# Patient Record
Sex: MALE | Race: WHITE | Employment: OTHER | URBAN - METROPOLITAN AREA
[De-identification: names, ages, dates, MRNs, and addresses within clinical notes are randomized per-mention and may not be internally consistent; named-entity substitution may affect disease eponyms.]

---

## 2017-01-24 RX ORDER — LISINOPRIL 10 MG/1
10 TABLET ORAL DAILY
Qty: 90 TABLET | Refills: 1 | Status: SHIPPED | OUTPATIENT
Start: 2017-01-24

## 2017-03-25 ENCOUNTER — OFFICE VISIT (OUTPATIENT)
Dept: FAMILY MEDICINE CLINIC | Age: 57
End: 2017-03-25

## 2017-03-25 VITALS
OXYGEN SATURATION: 95 % | BODY MASS INDEX: 28.93 KG/M2 | HEART RATE: 81 BPM | SYSTOLIC BLOOD PRESSURE: 134 MMHG | HEIGHT: 66 IN | DIASTOLIC BLOOD PRESSURE: 90 MMHG | TEMPERATURE: 98.7 F | WEIGHT: 180 LBS

## 2017-03-25 DIAGNOSIS — S29.011A STRAIN OF RIGHT PECTORALIS MUSCLE, INITIAL ENCOUNTER: Primary | ICD-10-CM

## 2017-03-25 DIAGNOSIS — Z87.892 HX OF ANAPHYLAXIS: ICD-10-CM

## 2017-03-25 PROCEDURE — 1036F TOBACCO NON-USER: CPT | Performed by: NURSE PRACTITIONER

## 2017-03-25 PROCEDURE — 3017F COLORECTAL CA SCREEN DOC REV: CPT | Performed by: NURSE PRACTITIONER

## 2017-03-25 PROCEDURE — G8484 FLU IMMUNIZE NO ADMIN: HCPCS | Performed by: NURSE PRACTITIONER

## 2017-03-25 PROCEDURE — G8427 DOCREV CUR MEDS BY ELIG CLIN: HCPCS | Performed by: NURSE PRACTITIONER

## 2017-03-25 PROCEDURE — 99213 OFFICE O/P EST LOW 20 MIN: CPT | Performed by: NURSE PRACTITIONER

## 2017-03-25 PROCEDURE — G8419 CALC BMI OUT NRM PARAM NOF/U: HCPCS | Performed by: NURSE PRACTITIONER

## 2017-03-25 RX ORDER — METHYLPREDNISOLONE 4 MG/1
TABLET ORAL
Qty: 1 KIT | Refills: 0 | Status: SHIPPED | OUTPATIENT
Start: 2017-03-25 | End: 2017-03-31

## 2017-03-25 RX ORDER — ACETAMINOPHEN 160 MG
TABLET,DISINTEGRATING ORAL
COMMUNITY
Start: 2011-04-25

## 2017-03-25 RX ORDER — PHENOL 1.4 %
1 AEROSOL, SPRAY (ML) MUCOUS MEMBRANE
COMMUNITY
Start: 2011-04-25

## 2017-03-25 RX ORDER — EPINEPHRINE 0.3 MG/.3ML
INJECTION SUBCUTANEOUS
Qty: 2 EACH | Refills: 2 | Status: SHIPPED | OUTPATIENT
Start: 2017-03-25 | End: 2018-06-13 | Stop reason: SDUPTHER

## 2017-03-25 ASSESSMENT — ENCOUNTER SYMPTOMS
SINUS PRESSURE: 0
ABDOMINAL DISTENTION: 0
COLOR CHANGE: 0
COUGH: 0
VOMITING: 0
WHEEZING: 0
SHORTNESS OF BREATH: 0
NAUSEA: 0
CONSTIPATION: 0
SORE THROAT: 0

## 2017-04-17 RX ORDER — CYCLOBENZAPRINE HCL 10 MG
TABLET ORAL
Qty: 30 TABLET | Refills: 1 | Status: SHIPPED | OUTPATIENT
Start: 2017-04-17

## 2017-04-24 ENCOUNTER — HOSPITAL ENCOUNTER (OUTPATIENT)
Dept: GENERAL RADIOLOGY | Age: 57
Discharge: HOME | End: 2017-04-24
Payer: COMMERCIAL

## 2017-04-24 ENCOUNTER — OFFICE VISIT (OUTPATIENT)
Dept: FAMILY MEDICINE CLINIC | Age: 57
End: 2017-04-24

## 2017-04-24 VITALS
HEIGHT: 66 IN | SYSTOLIC BLOOD PRESSURE: 120 MMHG | OXYGEN SATURATION: 97 % | DIASTOLIC BLOOD PRESSURE: 80 MMHG | HEART RATE: 90 BPM | BODY MASS INDEX: 28.28 KG/M2 | RESPIRATION RATE: 16 BRPM | WEIGHT: 176 LBS | TEMPERATURE: 98.9 F

## 2017-04-24 DIAGNOSIS — J30.1 SEASONAL ALLERGIC RHINITIS DUE TO POLLEN: ICD-10-CM

## 2017-04-24 DIAGNOSIS — R05.9 PAIN AGGRAVATED BY COUGHING AND DEEP BREATHING: Primary | ICD-10-CM

## 2017-04-24 DIAGNOSIS — R05.9 PAIN AGGRAVATED BY COUGHING AND DEEP BREATHING: ICD-10-CM

## 2017-04-24 DIAGNOSIS — R52 PAIN AGGRAVATED BY COUGHING AND DEEP BREATHING: Primary | ICD-10-CM

## 2017-04-24 DIAGNOSIS — R52 PAIN AGGRAVATED BY COUGHING AND DEEP BREATHING: ICD-10-CM

## 2017-04-24 PROCEDURE — G8419 CALC BMI OUT NRM PARAM NOF/U: HCPCS | Performed by: NURSE PRACTITIONER

## 2017-04-24 PROCEDURE — 99213 OFFICE O/P EST LOW 20 MIN: CPT | Performed by: NURSE PRACTITIONER

## 2017-04-24 PROCEDURE — 71020 XR CHEST STANDARD TWO VW: CPT

## 2017-04-24 PROCEDURE — 3017F COLORECTAL CA SCREEN DOC REV: CPT | Performed by: NURSE PRACTITIONER

## 2017-04-24 PROCEDURE — 1036F TOBACCO NON-USER: CPT | Performed by: NURSE PRACTITIONER

## 2017-04-24 PROCEDURE — G8427 DOCREV CUR MEDS BY ELIG CLIN: HCPCS | Performed by: NURSE PRACTITIONER

## 2017-04-24 ASSESSMENT — ENCOUNTER SYMPTOMS
WHEEZING: 0
COUGH: 1
HEMOPTYSIS: 0
RHINORRHEA: 0

## 2017-05-30 RX ORDER — ROPINIROLE 2 MG/1
TABLET, FILM COATED ORAL
Qty: 90 TABLET | Refills: 3 | Status: SHIPPED | OUTPATIENT
Start: 2017-05-30 | End: 2018-05-11 | Stop reason: SDUPTHER

## 2017-10-06 DIAGNOSIS — E03.9 UNSPECIFIED HYPOTHYROIDISM: ICD-10-CM

## 2017-10-06 DIAGNOSIS — R10.10 UPPER ABDOMINAL PAIN: ICD-10-CM

## 2017-10-06 RX ORDER — OMEPRAZOLE 40 MG/1
CAPSULE, DELAYED RELEASE ORAL
Qty: 90 CAPSULE | Refills: 3 | Status: SHIPPED | OUTPATIENT
Start: 2017-10-06 | End: 2018-10-19 | Stop reason: SDUPTHER

## 2017-10-06 RX ORDER — LEVOTHYROXINE SODIUM 0.03 MG/1
TABLET ORAL
Qty: 90 TABLET | Refills: 3 | Status: SHIPPED | OUTPATIENT
Start: 2017-10-06 | End: 2018-07-25

## 2017-10-06 NOTE — TELEPHONE ENCOUNTER
pharmacy requesting refill. Please approve or deny this refill request. The order is pended. Thank you. LOV 10/8/16 (with you)    Next Visit Date:  No future appointments.

## 2018-01-16 ENCOUNTER — OFFICE VISIT (OUTPATIENT)
Dept: FAMILY MEDICINE CLINIC | Age: 58
End: 2018-01-16

## 2018-01-16 VITALS
RESPIRATION RATE: 18 BRPM | SYSTOLIC BLOOD PRESSURE: 138 MMHG | HEIGHT: 66 IN | BODY MASS INDEX: 29.79 KG/M2 | TEMPERATURE: 98.5 F | WEIGHT: 185.38 LBS | HEART RATE: 74 BPM | DIASTOLIC BLOOD PRESSURE: 88 MMHG

## 2018-01-16 DIAGNOSIS — J11.1 INFLUENZA: Primary | ICD-10-CM

## 2018-01-16 DIAGNOSIS — J06.9 URTI (ACUTE UPPER RESPIRATORY INFECTION): ICD-10-CM

## 2018-01-16 DIAGNOSIS — J01.00 ACUTE MAXILLARY SINUSITIS, RECURRENCE NOT SPECIFIED: ICD-10-CM

## 2018-01-16 LAB
INFLUENZA A ANTIBODY: NEGATIVE
INFLUENZA B ANTIBODY: NEGATIVE

## 2018-01-16 PROCEDURE — G8419 CALC BMI OUT NRM PARAM NOF/U: HCPCS | Performed by: INTERNAL MEDICINE

## 2018-01-16 PROCEDURE — 87804 INFLUENZA ASSAY W/OPTIC: CPT | Performed by: INTERNAL MEDICINE

## 2018-01-16 PROCEDURE — G8484 FLU IMMUNIZE NO ADMIN: HCPCS | Performed by: INTERNAL MEDICINE

## 2018-01-16 PROCEDURE — 3017F COLORECTAL CA SCREEN DOC REV: CPT | Performed by: INTERNAL MEDICINE

## 2018-01-16 PROCEDURE — 1036F TOBACCO NON-USER: CPT | Performed by: INTERNAL MEDICINE

## 2018-01-16 PROCEDURE — 99213 OFFICE O/P EST LOW 20 MIN: CPT | Performed by: INTERNAL MEDICINE

## 2018-01-16 PROCEDURE — G8427 DOCREV CUR MEDS BY ELIG CLIN: HCPCS | Performed by: INTERNAL MEDICINE

## 2018-01-16 RX ORDER — GUAIFENESIN 600 MG/1
600 TABLET, EXTENDED RELEASE ORAL 2 TIMES DAILY
Qty: 30 TABLET | Refills: 1 | Status: SHIPPED | OUTPATIENT
Start: 2018-01-16

## 2018-01-16 RX ORDER — OSELTAMIVIR PHOSPHATE 75 MG/1
75 CAPSULE ORAL 2 TIMES DAILY
Qty: 10 CAPSULE | Refills: 0 | Status: SHIPPED | OUTPATIENT
Start: 2018-01-16 | End: 2018-01-21

## 2018-01-16 ASSESSMENT — ENCOUNTER SYMPTOMS
CONSTIPATION: 0
EYE PAIN: 0
ABDOMINAL PAIN: 0
COLOR CHANGE: 0
NAUSEA: 0
BACK PAIN: 0
BLOOD IN STOOL: 0
EYE DISCHARGE: 0
DIARRHEA: 0
ABDOMINAL DISTENTION: 0
EYE ITCHING: 0
PHOTOPHOBIA: 0
EYE REDNESS: 0

## 2018-01-16 ASSESSMENT — PATIENT HEALTH QUESTIONNAIRE - PHQ9
SUM OF ALL RESPONSES TO PHQ9 QUESTIONS 1 & 2: 0
1. LITTLE INTEREST OR PLEASURE IN DOING THINGS: 0
2. FEELING DOWN, DEPRESSED OR HOPELESS: 0
SUM OF ALL RESPONSES TO PHQ QUESTIONS 1-9: 0

## 2018-01-16 NOTE — PROGRESS NOTES
Subjective:      Patient ID: Vamshi Johnson is a 61 y.o. male    Cough and fatigue x 1 day    HPI    Presents with 1 day of cough productive of clear phlegm and sore throat. Assoc generalized body aches and fatigue. Associated feverish feeling and chills. No chest pian, no SOB or wheezing. Assoc nasal congestion and runny nose. Patient declined the flu shot this season. Past Medical History:   Diagnosis Date    History of IBS     Hyperlipidemia     Hypothyroidism     LORIE (obstructive sleep apnea)     Restless legs syndrome      Past Surgical History:   Procedure Laterality Date    COLONOSCOPY  06/27/2013    DR. WREN     Social History     Social History    Marital status:      Spouse name: N/A    Number of children: N/A    Years of education: N/A     Occupational History    Not on file. Social History Main Topics    Smoking status: Never Smoker    Smokeless tobacco: Never Used    Alcohol use Yes      Comment: social    Drug use: Unknown    Sexual activity: Not on file     Other Topics Concern    Not on file     Social History Narrative    No narrative on file     History reviewed. No pertinent family history.   Allergies:  Bee venom; Klonopin [clonazepam]; and Zocor [simvastatin]  Patient Active Problem List   Diagnosis    Restless legs syndrome    LORIE (obstructive sleep apnea)    Acquired hypothyroidism    Mixed hyperlipidemia     Current Outpatient Prescriptions on File Prior to Visit   Medication Sig Dispense Refill    levothyroxine (SYNTHROID) 25 MCG tablet TAKE 1 TABLET EVERY MORNING BEFORE MEAL 90 tablet 3    omeprazole (PRILOSEC) 40 MG delayed release capsule TAKE 1 CAPSULE BY MOUTH DAILY 90 capsule 3    rOPINIRole (REQUIP) 2 MG tablet TAKE 1 TABLET EVERY DAY 90 tablet 3    cyclobenzaprine (FLEXERIL) 10 MG tablet TAKE 1 TABLET BY MOUTH 3 TIMES DAILY AS NEEDED FOR MUSCLE SPASMS 30 tablet 1    Garlic 4169 MG TABS Take by mouth      Cholecalciferol (VITAMIN D3)

## 2018-01-22 ENCOUNTER — OFFICE VISIT (OUTPATIENT)
Dept: FAMILY MEDICINE CLINIC | Age: 58
End: 2018-01-22
Payer: COMMERCIAL

## 2018-01-22 VITALS
HEART RATE: 104 BPM | SYSTOLIC BLOOD PRESSURE: 150 MMHG | RESPIRATION RATE: 12 BRPM | TEMPERATURE: 98.6 F | DIASTOLIC BLOOD PRESSURE: 98 MMHG

## 2018-01-22 DIAGNOSIS — J01.40 ACUTE NON-RECURRENT PANSINUSITIS: Primary | ICD-10-CM

## 2018-01-22 PROCEDURE — G8427 DOCREV CUR MEDS BY ELIG CLIN: HCPCS | Performed by: NURSE PRACTITIONER

## 2018-01-22 PROCEDURE — G8484 FLU IMMUNIZE NO ADMIN: HCPCS | Performed by: NURSE PRACTITIONER

## 2018-01-22 PROCEDURE — 1036F TOBACCO NON-USER: CPT | Performed by: NURSE PRACTITIONER

## 2018-01-22 PROCEDURE — 99213 OFFICE O/P EST LOW 20 MIN: CPT | Performed by: NURSE PRACTITIONER

## 2018-01-22 PROCEDURE — 3017F COLORECTAL CA SCREEN DOC REV: CPT | Performed by: NURSE PRACTITIONER

## 2018-01-22 PROCEDURE — G8417 CALC BMI ABV UP PARAM F/U: HCPCS | Performed by: NURSE PRACTITIONER

## 2018-01-22 RX ORDER — AMOXICILLIN AND CLAVULANATE POTASSIUM 562.5; 437.5; 62.5 MG/1; MG/1; MG/1
1 TABLET, FILM COATED, EXTENDED RELEASE ORAL 2 TIMES DAILY
Qty: 14 TABLET | Refills: 0 | Status: SHIPPED | OUTPATIENT
Start: 2018-01-22 | End: 2018-01-29

## 2018-01-22 ASSESSMENT — ENCOUNTER SYMPTOMS
RHINORRHEA: 1
COUGH: 1

## 2018-01-22 NOTE — PROGRESS NOTES
(REQUIP) 2 MG tablet TAKE 1 TABLET EVERY DAY 90 tablet 3    cyclobenzaprine (FLEXERIL) 10 MG tablet TAKE 1 TABLET BY MOUTH 3 TIMES DAILY AS NEEDED FOR MUSCLE SPASMS 30 tablet 1    Garlic 4392 MG TABS Take by mouth      Cholecalciferol (VITAMIN D3) 2000 UNITS CAPS Take by mouth      Multiple Vitamins-Minerals (MULTIVITAMIN ADULTS 50+) TABS Take 1 tablet by mouth      EPINEPHrine (EPIPEN) 0.3 MG/0.3ML SOAJ injection Use as directed for allergic reaction 2 each 2    lisinopril (PRINIVIL;ZESTRIL) 10 MG tablet Take 1 tablet by mouth daily 90 tablet 1    meloxicam (MOBIC) 7.5 MG tablet Take 1 tablet by mouth daily 30 tablet 3    cetirizine (ZYRTEC) 10 MG tablet Take 10 mg by mouth daily      valACYclovir (VALTREX) 500 MG tablet TAKE 1 TABLET BY MOUTH 2 TIMES DAILY 30 tablet 2    EPINEPHrine (EPIPEN 2-PAMELLA) 0.3 MG/0.3ML TRUE injection Inject 0.3 mLs into the skin once as needed for 1 dose. Use as directed for allergic reaction 2 Device 4     No current facility-administered medications on file prior to visit. Allergies:  Bee venom; Klonopin [clonazepam]; and Zocor [simvastatin]    Review of Systems   HENT: Positive for ear pain, postnasal drip and rhinorrhea. Respiratory: Positive for cough. Objective:   BP (!) 150/98   Pulse 104   Temp 98.6 °F (37 °C) (Temporal)   Resp 12     Physical Exam   Constitutional: He is oriented to person, place, and time. He appears well-developed and well-nourished. HENT:   Right Ear: A middle ear effusion is present. Left Ear: A middle ear effusion is present. Nose: Mucosal edema and rhinorrhea present. Right sinus exhibits maxillary sinus tenderness and frontal sinus tenderness. Left sinus exhibits maxillary sinus tenderness and frontal sinus tenderness. Mouth/Throat: Uvula is midline, oropharynx is clear and moist and mucous membranes are normal.   Eyes: Conjunctivae are normal.   Cardiovascular: Normal rate, regular rhythm and normal heart sounds.

## 2018-01-25 ENCOUNTER — TELEPHONE (OUTPATIENT)
Dept: OTHER | Facility: CLINIC | Age: 58
End: 2018-01-25

## 2018-03-07 DIAGNOSIS — B00.9 HSV INFECTION: ICD-10-CM

## 2018-03-07 RX ORDER — VALACYCLOVIR HYDROCHLORIDE 500 MG/1
TABLET, FILM COATED ORAL
Qty: 30 TABLET | Refills: 2 | Status: SHIPPED | OUTPATIENT
Start: 2018-03-07 | End: 2020-10-06 | Stop reason: SDUPTHER

## 2018-03-07 NOTE — TELEPHONE ENCOUNTER
Pharmacy requesting refill please approve or deny. Last seen 1/22/2018  Last filled 07/19/2016    No future appointments.

## 2018-05-10 ENCOUNTER — TELEPHONE (OUTPATIENT)
Dept: FAMILY MEDICINE CLINIC | Age: 58
End: 2018-05-10

## 2018-05-11 RX ORDER — ROPINIROLE 2 MG/1
TABLET, FILM COATED ORAL
Qty: 90 TABLET | Refills: 3 | Status: SHIPPED | OUTPATIENT
Start: 2018-05-11 | End: 2019-02-11 | Stop reason: SDUPTHER

## 2018-06-13 DIAGNOSIS — Z87.892 HX OF ANAPHYLAXIS: ICD-10-CM

## 2018-06-13 DIAGNOSIS — Z78.9 ALLERGY HISTORY UNKNOWN: ICD-10-CM

## 2018-06-13 RX ORDER — EPINEPHRINE 0.3 MG/.3ML
INJECTION SUBCUTANEOUS
Qty: 2 EACH | Refills: 4 | Status: SHIPPED | OUTPATIENT
Start: 2018-06-13 | End: 2018-07-09 | Stop reason: SDUPTHER

## 2018-07-09 ENCOUNTER — OFFICE VISIT (OUTPATIENT)
Dept: FAMILY MEDICINE CLINIC | Age: 58
End: 2018-07-09
Payer: COMMERCIAL

## 2018-07-09 VITALS
OXYGEN SATURATION: 99 % | BODY MASS INDEX: 28.93 KG/M2 | SYSTOLIC BLOOD PRESSURE: 142 MMHG | HEART RATE: 85 BPM | WEIGHT: 180 LBS | TEMPERATURE: 97.7 F | RESPIRATION RATE: 16 BRPM | HEIGHT: 66 IN | DIASTOLIC BLOOD PRESSURE: 98 MMHG

## 2018-07-09 DIAGNOSIS — I10 BENIGN ESSENTIAL HTN: ICD-10-CM

## 2018-07-09 DIAGNOSIS — E78.2 MIXED HYPERLIPIDEMIA: ICD-10-CM

## 2018-07-09 DIAGNOSIS — Z12.5 PROSTATE CANCER SCREENING: ICD-10-CM

## 2018-07-09 DIAGNOSIS — Z23 NEED FOR TETANUS BOOSTER: ICD-10-CM

## 2018-07-09 DIAGNOSIS — Z00.00 ANNUAL PHYSICAL EXAM: Primary | ICD-10-CM

## 2018-07-09 PROCEDURE — 99396 PREV VISIT EST AGE 40-64: CPT | Performed by: FAMILY MEDICINE

## 2018-07-09 PROCEDURE — 90714 TD VACC NO PRESV 7 YRS+ IM: CPT | Performed by: FAMILY MEDICINE

## 2018-07-09 PROCEDURE — 90471 IMMUNIZATION ADMIN: CPT | Performed by: FAMILY MEDICINE

## 2018-07-09 NOTE — PROGRESS NOTES
Subjective  Shefalisenthil Melendez, 60 y.o. male presents today with:  Chief Complaint   Patient presents with    Annual Exam       HPI  Patient presents today for his annual physical.  Doing well-no cough/sob  Doing ok off zestril  ;NO cp/palp/irwin/edema   ; No cardio-pulmonary probs;compliant with diet/rxs;no new gi-gu complaints  Active   Needs labs   Rev/rxs; No abuse nor side effects on meds     Review of Systems   Constitutional: Negative for fatigue. HENT: Positive for congestion. Eyes: Negative for visual disturbance. Respiratory: Negative for shortness of breath. Cardiovascular: Negative for chest pain, palpitations and leg swelling. Gastrointestinal: Negative for abdominal pain and nausea. Genitourinary: Negative for dysuria, flank pain and frequency. Musculoskeletal: Positive for arthralgias and myalgias. Neurological: Negative for headaches. Psychiatric/Behavioral: Negative for dysphoric mood and sleep disturbance. Allergies   Allergen Reactions    Bee Venom Hives    Klonopin [Clonazepam] Other (See Comments)     Elevated CNS    Zocor [Simvastatin] Other (See Comments)     Headache       Past Medical History:   Diagnosis Date    History of IBS     Hyperlipidemia     Hypothyroidism     LORIE (obstructive sleep apnea)     Restless legs syndrome      Past Surgical History:   Procedure Laterality Date    COLONOSCOPY  06/27/2013    DR. WREN     Social History     Social History    Marital status:      Spouse name: N/A    Number of children: N/A    Years of education: N/A     Occupational History    Not on file. Social History Main Topics    Smoking status: Never Smoker    Smokeless tobacco: Never Used    Alcohol use Yes      Comment: social    Drug use: Unknown    Sexual activity: Not on file     Other Topics Concern    Not on file     Social History Narrative    No narrative on file     No family history on file.        Current Outpatient Prescriptions on File Prior Future     Number of Occurrences:   1     Standing Expiration Date:   7/9/2019    Lipid Panel     Standing Status:   Future     Number of Occurrences:   1     Standing Expiration Date:   7/9/2019     Order Specific Question:   Is Patient Fasting?/# of Hours     Answer:   10 HOURS    Comprehensive Metabolic Panel     Standing Status:   Future     Number of Occurrences:   1     Standing Expiration Date:   7/9/2019     No orders of the defined types were placed in this encounter. Medications Discontinued During This Encounter   Medication Reason    EPINEPHrine (EPIPEN) 0.3 MG/0.3ML SOAJ injection DUPLICATE     Return in about 3 months (around 10/9/2018) for htn. Call if sxs  Call or return to clinic prn if these symptoms worsen or fail to improve as anticipated.       Jodi Gan, DO

## 2018-07-14 DIAGNOSIS — Z12.5 PROSTATE CANCER SCREENING: ICD-10-CM

## 2018-07-14 DIAGNOSIS — E03.9 HYPOTHYROIDISM: ICD-10-CM

## 2018-07-14 DIAGNOSIS — I10 BENIGN ESSENTIAL HTN: ICD-10-CM

## 2018-07-14 DIAGNOSIS — Z00.00 ANNUAL PHYSICAL EXAM: ICD-10-CM

## 2018-07-14 LAB
ALBUMIN SERPL-MCNC: 4 G/DL (ref 3.9–4.9)
ALP BLD-CCNC: 81 U/L (ref 35–104)
ALT SERPL-CCNC: 33 U/L (ref 0–41)
ANION GAP SERPL CALCULATED.3IONS-SCNC: 12 MEQ/L (ref 7–13)
AST SERPL-CCNC: 22 U/L (ref 0–40)
BILIRUB SERPL-MCNC: <0.2 MG/DL (ref 0–1.2)
BUN BLDV-MCNC: 16 MG/DL (ref 6–20)
CALCIUM SERPL-MCNC: 8.7 MG/DL (ref 8.6–10.2)
CHLORIDE BLD-SCNC: 103 MEQ/L (ref 98–107)
CHOLESTEROL, TOTAL: 417 MG/DL (ref 0–199)
CO2: 27 MEQ/L (ref 22–29)
CREAT SERPL-MCNC: 0.8 MG/DL (ref 0.7–1.2)
GFR AFRICAN AMERICAN: >60
GFR NON-AFRICAN AMERICAN: >60
GLOBULIN: 2.5 G/DL (ref 2.3–3.5)
GLUCOSE BLD-MCNC: 98 MG/DL (ref 74–109)
HDLC SERPL-MCNC: 43 MG/DL (ref 40–59)
LDL CHOLESTEROL CALCULATED: 303 MG/DL (ref 0–129)
POTASSIUM SERPL-SCNC: 4.4 MEQ/L (ref 3.5–5.1)
PROSTATE SPECIFIC ANTIGEN: 0.79 NG/ML (ref 0–5.4)
SODIUM BLD-SCNC: 142 MEQ/L (ref 132–144)
TOTAL PROTEIN: 6.5 G/DL (ref 6.4–8.1)
TRIGL SERPL-MCNC: 357 MG/DL (ref 0–200)
TSH SERPL DL<=0.05 MIU/L-ACNC: 5.1 UIU/ML (ref 0.27–4.2)

## 2018-07-22 DIAGNOSIS — E03.9 ACQUIRED HYPOTHYROIDISM: Primary | ICD-10-CM

## 2018-07-22 RX ORDER — LEVOTHYROXINE SODIUM 0.05 MG/1
50 TABLET ORAL DAILY
Qty: 60 TABLET | Refills: 3 | Status: SHIPPED | OUTPATIENT
Start: 2018-07-22 | End: 2019-03-05 | Stop reason: SDUPTHER

## 2018-07-22 ASSESSMENT — ENCOUNTER SYMPTOMS
ABDOMINAL PAIN: 0
SHORTNESS OF BREATH: 0
NAUSEA: 0

## 2018-07-25 ENCOUNTER — OFFICE VISIT (OUTPATIENT)
Dept: FAMILY MEDICINE CLINIC | Age: 58
End: 2018-07-25
Payer: COMMERCIAL

## 2018-07-25 VITALS
HEART RATE: 79 BPM | WEIGHT: 182.6 LBS | DIASTOLIC BLOOD PRESSURE: 70 MMHG | TEMPERATURE: 98.5 F | RESPIRATION RATE: 16 BRPM | BODY MASS INDEX: 29.92 KG/M2 | SYSTOLIC BLOOD PRESSURE: 132 MMHG

## 2018-07-25 DIAGNOSIS — E78.2 MIXED HYPERLIPIDEMIA: Primary | ICD-10-CM

## 2018-07-25 DIAGNOSIS — M25.50 ARTHRALGIA, UNSPECIFIED JOINT: ICD-10-CM

## 2018-07-25 DIAGNOSIS — E03.9 ACQUIRED HYPOTHYROIDISM: ICD-10-CM

## 2018-07-25 PROCEDURE — 1036F TOBACCO NON-USER: CPT | Performed by: NURSE PRACTITIONER

## 2018-07-25 PROCEDURE — G8427 DOCREV CUR MEDS BY ELIG CLIN: HCPCS | Performed by: NURSE PRACTITIONER

## 2018-07-25 PROCEDURE — 3017F COLORECTAL CA SCREEN DOC REV: CPT | Performed by: NURSE PRACTITIONER

## 2018-07-25 PROCEDURE — G8417 CALC BMI ABV UP PARAM F/U: HCPCS | Performed by: NURSE PRACTITIONER

## 2018-07-25 PROCEDURE — 99213 OFFICE O/P EST LOW 20 MIN: CPT | Performed by: NURSE PRACTITIONER

## 2018-07-25 RX ORDER — ROSUVASTATIN CALCIUM 20 MG/1
20 TABLET, COATED ORAL DAILY
Qty: 90 TABLET | Refills: 1 | Status: SHIPPED | OUTPATIENT
Start: 2018-07-25 | End: 2019-01-28 | Stop reason: SDUPTHER

## 2018-07-25 RX ORDER — MELOXICAM 7.5 MG/1
7.5 TABLET ORAL DAILY
Qty: 90 TABLET | Refills: 1 | Status: SHIPPED | OUTPATIENT
Start: 2018-07-25

## 2018-07-25 RX ORDER — ICOSAPENT ETHYL 1000 MG/1
2 CAPSULE ORAL 2 TIMES DAILY
Qty: 360 CAPSULE | Refills: 1 | Status: SHIPPED | OUTPATIENT
Start: 2018-07-25

## 2018-07-25 ASSESSMENT — ENCOUNTER SYMPTOMS
GASTROINTESTINAL NEGATIVE: 1
RESPIRATORY NEGATIVE: 1

## 2018-07-25 NOTE — PROGRESS NOTES
Subjective:      Patient ID: Lisbeth Woodard is a 60 y.o. male who presents today for:  Chief Complaint   Patient presents with    Hyperlipidemia     follow up on labs    Hypertension       HPI    Patient had physical recently, wanting to f/u on blood work. Doing well, no concerns or complaints today. Past Medical History:   Diagnosis Date    History of IBS     Hyperlipidemia     Hypothyroidism     LORIE (obstructive sleep apnea)     Restless legs syndrome      Past Surgical History:   Procedure Laterality Date    COLONOSCOPY  06/27/2013    DR. WREN     No family history on file. Social History     Social History    Marital status:      Spouse name: N/A    Number of children: N/A    Years of education: N/A     Occupational History    Not on file.      Social History Main Topics    Smoking status: Never Smoker    Smokeless tobacco: Never Used    Alcohol use Yes      Comment: social    Drug use: Unknown    Sexual activity: Not on file     Other Topics Concern    Not on file     Social History Narrative    No narrative on file     Current Outpatient Prescriptions on File Prior to Visit   Medication Sig Dispense Refill    levothyroxine (SYNTHROID) 50 MCG tablet Take 1 tablet by mouth Daily 60 tablet 3    rOPINIRole (REQUIP) 2 MG tablet TAKE 1 TABLET EVERY DAY 90 tablet 3    valACYclovir (VALTREX) 500 MG tablet TAKE 1 TABLET BY MOUTH 2 TIMES DAILY 30 tablet 2    guaiFENesin (MUCINEX) 600 MG extended release tablet Take 1 tablet by mouth 2 times daily 30 tablet 1    omeprazole (PRILOSEC) 40 MG delayed release capsule TAKE 1 CAPSULE BY MOUTH DAILY 90 capsule 3    cyclobenzaprine (FLEXERIL) 10 MG tablet TAKE 1 TABLET BY MOUTH 3 TIMES DAILY AS NEEDED FOR MUSCLE SPASMS 30 tablet 1    Garlic 2960 MG TABS Take by mouth      Cholecalciferol (VITAMIN D3) 2000 UNITS CAPS Take by mouth      Multiple Vitamins-Minerals (MULTIVITAMIN ADULTS 50+) TABS Take 1 tablet by mouth      cetirizine (ZYRTEC) 10 MG tablet Take 10 mg by mouth daily      EPINEPHrine (EPIPEN 2-PAMELLA) 0.3 MG/0.3ML TRUE injection Inject 0.3 mLs into the skin once as needed for 1 dose. Use as directed for allergic reaction 2 Device 4    lisinopril (PRINIVIL;ZESTRIL) 10 MG tablet Take 1 tablet by mouth daily 90 tablet 1     No current facility-administered medications on file prior to visit. Allergies:  Bee venom; Klonopin [clonazepam]; and Zocor [simvastatin]    Review of Systems   Constitutional: Negative for fatigue. HENT: Negative. Respiratory: Negative. Cardiovascular: Negative. Gastrointestinal: Negative. Genitourinary: Negative. Musculoskeletal: Positive for arthralgias. Neurological: Negative. Objective:   /70   Pulse 79   Temp 98.5 °F (36.9 °C) (Temporal)   Resp 16   Wt 182 lb 9.6 oz (82.8 kg)   BMI 29.92 kg/m²     Physical Exam   Constitutional: He is oriented to person, place, and time. He appears well-developed and well-nourished. Eyes: Conjunctivae are normal.   Neck: Carotid bruit is not present. Cardiovascular: Normal rate, regular rhythm and normal heart sounds. Pulmonary/Chest: Effort normal and breath sounds normal. He has no wheezes. He has no rales. Abdominal: Soft. Bowel sounds are normal. He exhibits no distension and no abdominal bruit. There is no tenderness. Musculoskeletal: He exhibits no edema. Neurological: He is alert and oriented to person, place, and time. Skin: Skin is warm and dry. Assessment:      Diagnosis Orders   1. Mixed hyperlipidemia     2. Acquired hypothyroidism     3. Arthralgia, unspecified joint           Plan:      No orders of the defined types were placed in this encounter.     Orders Placed This Encounter   Medications    meloxicam (MOBIC) 7.5 MG tablet     Sig: Take 1 tablet by mouth daily     Dispense:  90 tablet     Refill:  1    rosuvastatin (CRESTOR) 20 MG tablet     Sig: Take 1 tablet by mouth daily     Dispense:  90 tablet

## 2018-10-02 ENCOUNTER — TELEPHONE (OUTPATIENT)
Dept: FAMILY MEDICINE CLINIC | Age: 58
End: 2018-10-02

## 2018-10-05 ENCOUNTER — OFFICE VISIT (OUTPATIENT)
Dept: FAMILY MEDICINE CLINIC | Age: 58
End: 2018-10-05
Payer: COMMERCIAL

## 2018-10-05 VITALS
HEIGHT: 66 IN | HEART RATE: 72 BPM | SYSTOLIC BLOOD PRESSURE: 124 MMHG | WEIGHT: 179 LBS | TEMPERATURE: 97.9 F | BODY MASS INDEX: 28.77 KG/M2 | DIASTOLIC BLOOD PRESSURE: 76 MMHG | RESPIRATION RATE: 14 BRPM

## 2018-10-05 DIAGNOSIS — R53.82 CHRONIC FATIGUE: ICD-10-CM

## 2018-10-05 DIAGNOSIS — Z23 NEED FOR INFLUENZA VACCINATION: ICD-10-CM

## 2018-10-05 DIAGNOSIS — E78.2 MIXED HYPERLIPIDEMIA: ICD-10-CM

## 2018-10-05 DIAGNOSIS — E03.9 ACQUIRED HYPOTHYROIDISM: Primary | ICD-10-CM

## 2018-10-05 PROCEDURE — 99213 OFFICE O/P EST LOW 20 MIN: CPT | Performed by: FAMILY MEDICINE

## 2018-10-05 PROCEDURE — G8427 DOCREV CUR MEDS BY ELIG CLIN: HCPCS | Performed by: FAMILY MEDICINE

## 2018-10-05 PROCEDURE — 90471 IMMUNIZATION ADMIN: CPT | Performed by: FAMILY MEDICINE

## 2018-10-05 PROCEDURE — 1036F TOBACCO NON-USER: CPT | Performed by: FAMILY MEDICINE

## 2018-10-05 PROCEDURE — G8417 CALC BMI ABV UP PARAM F/U: HCPCS | Performed by: FAMILY MEDICINE

## 2018-10-05 PROCEDURE — 3017F COLORECTAL CA SCREEN DOC REV: CPT | Performed by: FAMILY MEDICINE

## 2018-10-05 PROCEDURE — G8482 FLU IMMUNIZE ORDER/ADMIN: HCPCS | Performed by: FAMILY MEDICINE

## 2018-10-05 PROCEDURE — 90688 IIV4 VACCINE SPLT 0.5 ML IM: CPT | Performed by: FAMILY MEDICINE

## 2018-10-05 NOTE — PROGRESS NOTES
lisinopril (PRINIVIL;ZESTRIL) 10 MG tablet Take 1 tablet by mouth daily 90 tablet 1     No current facility-administered medications on file prior to visit. Objective    Vitals:    10/05/18 0929   BP: 124/76   Site: Right Upper Arm   Position: Sitting   Cuff Size: Medium Adult   Pulse: 72   Resp: 14   Temp: 97.9 °F (36.6 °C)   TempSrc: Temporal   Weight: 179 lb (81.2 kg)   Height: 5' 5.5\" (1.664 m)     Physical Exam   Constitutional: He is oriented to person, place, and time and well-developed, well-nourished, and in no distress. No distress. Eyes: No scleral icterus. Neck: Normal range of motion. Neck supple. Carotid bruit is not present. No neck rigidity. No thyroid mass and no thyromegaly present. Cardiovascular: Normal rate, regular rhythm, S1 normal, S2 normal and normal heart sounds. No extrasystoles are present. No murmur heard. Pulmonary/Chest: Effort normal and breath sounds normal.   Abdominal: Soft. Normal appearance. He exhibits no mass. There is no hepatosplenomegaly. There is no tenderness. There is no rebound and no CVA tenderness. Musculoskeletal: He exhibits no edema. Lymphadenopathy:        Head (right side): No submandibular adenopathy present. Head (left side): No submandibular adenopathy present. Right cervical: No posterior cervical adenopathy present. Left cervical: No posterior cervical adenopathy present. Neurological: He is alert and oriented to person, place, and time. He has intact cranial nerves. Gait normal.   Skin: He is not diaphoretic. Psychiatric: Mood and affect normal.     Orders Only on 07/14/2018   Component Date Value Ref Range Status    TSH 07/14/2018 5.100* 0.270 - 4.200 uIU/mL Final    PSA 07/14/2018 0.79  0.00 - 5.40 ng/mL Final    Comment: When the Total PSA is between 3.00 and 10.00 ng/mL, consider  requesting a Free PSA to aid in diagnosis.         Cholesterol, Total 07/14/2018 417* 0 - 199 mg/dL Final    ATP III

## 2018-10-06 DIAGNOSIS — E78.2 MIXED HYPERLIPIDEMIA: ICD-10-CM

## 2018-10-06 DIAGNOSIS — R53.82 CHRONIC FATIGUE: ICD-10-CM

## 2018-10-06 DIAGNOSIS — E03.9 ACQUIRED HYPOTHYROIDISM: ICD-10-CM

## 2018-10-06 LAB
CHOLESTEROL, TOTAL: 191 MG/DL (ref 0–199)
HDLC SERPL-MCNC: 43 MG/DL (ref 40–59)
LDL CHOLESTEROL CALCULATED: 103 MG/DL (ref 0–129)
TRIGL SERPL-MCNC: 226 MG/DL (ref 0–200)
TSH SERPL DL<=0.05 MIU/L-ACNC: 3.41 UIU/ML (ref 0.27–4.2)

## 2018-10-09 LAB — TESTOSTERONE TOTAL-MALE: 434 NG/DL (ref 300–890)

## 2018-10-14 ASSESSMENT — ENCOUNTER SYMPTOMS
DIARRHEA: 0
ABDOMINAL PAIN: 0
SHORTNESS OF BREATH: 0
BLOOD IN STOOL: 0
NAUSEA: 0

## 2018-10-19 DIAGNOSIS — R10.10 UPPER ABDOMINAL PAIN: ICD-10-CM

## 2018-10-19 RX ORDER — OMEPRAZOLE 40 MG/1
CAPSULE, DELAYED RELEASE ORAL
Qty: 90 CAPSULE | Refills: 3 | Status: SHIPPED | OUTPATIENT
Start: 2018-10-19

## 2019-01-28 RX ORDER — ROSUVASTATIN CALCIUM 20 MG/1
TABLET, COATED ORAL
Qty: 90 TABLET | Refills: 1 | Status: SHIPPED | OUTPATIENT
Start: 2019-01-28

## 2019-02-11 RX ORDER — ROPINIROLE 2 MG/1
TABLET, FILM COATED ORAL
Qty: 90 TABLET | Refills: 3 | Status: SHIPPED | OUTPATIENT
Start: 2019-02-11

## 2019-03-05 DIAGNOSIS — E03.9 ACQUIRED HYPOTHYROIDISM: ICD-10-CM

## 2019-03-05 RX ORDER — LEVOTHYROXINE SODIUM 0.05 MG/1
50 TABLET ORAL DAILY
Qty: 60 TABLET | Refills: 3 | Status: SHIPPED | OUTPATIENT
Start: 2019-03-05

## 2020-10-06 ENCOUNTER — OFFICE VISIT (OUTPATIENT)
Dept: PRIMARY CARE CLINIC | Age: 60
End: 2020-10-06
Payer: COMMERCIAL

## 2020-10-06 VITALS
HEART RATE: 76 BPM | OXYGEN SATURATION: 98 % | WEIGHT: 194.6 LBS | DIASTOLIC BLOOD PRESSURE: 88 MMHG | SYSTOLIC BLOOD PRESSURE: 138 MMHG | HEIGHT: 65 IN | BODY MASS INDEX: 32.42 KG/M2 | TEMPERATURE: 98.4 F | RESPIRATION RATE: 16 BRPM

## 2020-10-06 PROCEDURE — 3017F COLORECTAL CA SCREEN DOC REV: CPT | Performed by: NURSE PRACTITIONER

## 2020-10-06 PROCEDURE — G8484 FLU IMMUNIZE NO ADMIN: HCPCS | Performed by: NURSE PRACTITIONER

## 2020-10-06 PROCEDURE — G8427 DOCREV CUR MEDS BY ELIG CLIN: HCPCS | Performed by: NURSE PRACTITIONER

## 2020-10-06 PROCEDURE — 4004F PT TOBACCO SCREEN RCVD TLK: CPT | Performed by: NURSE PRACTITIONER

## 2020-10-06 PROCEDURE — G8417 CALC BMI ABV UP PARAM F/U: HCPCS | Performed by: NURSE PRACTITIONER

## 2020-10-06 PROCEDURE — 99213 OFFICE O/P EST LOW 20 MIN: CPT | Performed by: NURSE PRACTITIONER

## 2020-10-06 RX ORDER — ACYCLOVIR 50 MG/G
OINTMENT TOPICAL
Qty: 1 TUBE | Refills: 0 | Status: SHIPPED | OUTPATIENT
Start: 2020-10-06 | End: 2020-10-13

## 2020-10-06 RX ORDER — VALACYCLOVIR HYDROCHLORIDE 500 MG/1
TABLET, FILM COATED ORAL
Qty: 30 TABLET | Refills: 0 | Status: SHIPPED | OUTPATIENT
Start: 2020-10-06

## 2020-10-06 RX ORDER — AMOXICILLIN AND CLAVULANATE POTASSIUM 875; 125 MG/1; MG/1
1 TABLET, FILM COATED ORAL 2 TIMES DAILY
Qty: 14 TABLET | Refills: 0 | Status: SHIPPED | OUTPATIENT
Start: 2020-10-06 | End: 2020-10-13

## 2020-10-06 SDOH — ECONOMIC STABILITY: INCOME INSECURITY: HOW HARD IS IT FOR YOU TO PAY FOR THE VERY BASICS LIKE FOOD, HOUSING, MEDICAL CARE, AND HEATING?: NOT HARD AT ALL

## 2020-10-06 SDOH — ECONOMIC STABILITY: FOOD INSECURITY: WITHIN THE PAST 12 MONTHS, YOU WORRIED THAT YOUR FOOD WOULD RUN OUT BEFORE YOU GOT MONEY TO BUY MORE.: NEVER TRUE

## 2020-10-06 SDOH — ECONOMIC STABILITY: FOOD INSECURITY: WITHIN THE PAST 12 MONTHS, THE FOOD YOU BOUGHT JUST DIDN'T LAST AND YOU DIDN'T HAVE MONEY TO GET MORE.: NEVER TRUE

## 2020-10-06 SDOH — ECONOMIC STABILITY: TRANSPORTATION INSECURITY
IN THE PAST 12 MONTHS, HAS THE LACK OF TRANSPORTATION KEPT YOU FROM MEDICAL APPOINTMENTS OR FROM GETTING MEDICATIONS?: NO

## 2020-10-06 SDOH — ECONOMIC STABILITY: TRANSPORTATION INSECURITY
IN THE PAST 12 MONTHS, HAS LACK OF TRANSPORTATION KEPT YOU FROM MEETINGS, WORK, OR FROM GETTING THINGS NEEDED FOR DAILY LIVING?: NO

## 2020-10-06 ASSESSMENT — ENCOUNTER SYMPTOMS
HOARSE VOICE: 1
SORE THROAT: 1
EYE REDNESS: 0
SINUS PRESSURE: 1
APNEA: 0
SHORTNESS OF BREATH: 0
CONSTIPATION: 0
SWOLLEN GLANDS: 0
COUGH: 0
SINUS PAIN: 0
EYE ITCHING: 0
WHEEZING: 0
SINUS COMPLAINT: 1

## 2020-10-06 ASSESSMENT — PATIENT HEALTH QUESTIONNAIRE - PHQ9
2. FEELING DOWN, DEPRESSED OR HOPELESS: 0
SUM OF ALL RESPONSES TO PHQ9 QUESTIONS 1 & 2: 0
SUM OF ALL RESPONSES TO PHQ QUESTIONS 1-9: 0
1. LITTLE INTEREST OR PLEASURE IN DOING THINGS: 0
SUM OF ALL RESPONSES TO PHQ QUESTIONS 1-9: 0

## 2020-10-06 NOTE — PATIENT INSTRUCTIONS
Attempted to call patient but the phone kept ringing unable to reach voicemail.   Patient Education        Cold Sores: Care Instructions  Your Care Instructions  Cold sores are clusters of small blisters on the lip and skin around or inside the mouth. Often the first sign of a cold sore is a spot that tingles, burns, or itches. A blister usually forms within 24 hours. The skin around the blisters can be red and inflamed. The blisters can break open, weep a clear fluid, and then scab over after a few days. Cold sores most often heal in 7 to 10 days without a scar. They are sometimes called fever blisters. Cold sores are caused by a virus called the herpes simplex virus. This virus also causes some cases of genital herpes. The virus can spread from a sore in the genital area to the lips. Or it can spread from a cold sore on the lips to the genital area. Cold sores most often go away on their own. But if they are severe, embarrass you, or cause pain, your doctor may prescribe antiviral medicine to relieve pain and help prevent outbreaks. Follow-up care is a key part of your treatment and safety. Be sure to make and go to all appointments, and call your doctor if you are having problems. It's also a good idea to know your test results and keep a list of the medicines you take. How can you care for yourself at home? · Wash your hands often. And try not to touch your cold sores. This will help to avoid spreading the virus to your eyes or genital area, or to other people. This is more likely to happen if this is your first cold sore outbreak. · Place ice or a cool, wet towel on the sores 3 times a day. Do this for 20 minutes each time. It may help to reduce redness and swelling. · If you are just getting a cold sore, try over-the-counter docosanol (Abreva) cream to reduce symptoms. · If your doctor prescribed antiviral medicine to relieve pain and help prevent outbreaks, be sure to follow the directions.   · Take an over-the-counter pain medicine, such as acetaminophen (Tylenol), ibuprofen (Advil, Motrin), or naproxen (Aleve), as needed. Read and follow all instructions on the label. · Do not take two or more pain medicines at the same time unless the doctor told you to. Many pain medicines have acetaminophen, which is Tylenol. Too much acetaminophen (Tylenol) can be harmful. · Avoid citrus fruit, tomatoes, and other foods that contain acid. · Use over-the-counter ointments to numb sore areas in the mouth or on the lips. These include Orajel and Anbesol. · Do not kiss or have oral sex with anyone while you have a cold sore. To prevent cold sores in the future  · Avoid long exposure of your lips to the sun. (Wear a hat to help shade your mouth.)  · Do not kiss or have oral sex with someone who has a cold sore. Do not have sex or oral sex with someone who has a genital herpes outbreak. · Using lip balm that contains sunscreen may help reduce outbreaks of cold sores. · Do not share towels, razors, silverware, toothbrushes, or other objects with a person who has a cold sore. When should you call for help? Call your doctor now or seek immediate medical care if:  · Your symptoms are painful and you want to try antiviral medicine. · You have signs of infection, such as:  ? Increased pain, swelling, warmth, or redness. ? Red streaks leading from a cold sore. ? Pus draining from a cold sore. ? A fever. · You have a cold sore and develop eye pain, eye discharge, or any changes in your vision. Watch closely for changes in your health, and be sure to contact your doctor if:  · The cold sore does not heal in 7 to 10 days. · You get cold sores often. Where can you learn more? Go to https://Convopeshellyeweb.healthFaveeopartners. org and sign in to your [email or web address] account. Enter X926 in the Mamapedia box to learn more about \"Cold Sores: Care Instructions. \"     If you do not have an account, please click on the \"Sign Up Now\" link.   Current as of: February 26, 2020               Content Version: 12.5  © 2006-2020 Healthwise, Incorporated. Care instructions adapted under license by [facility] ([facility]). If you have questions about a medical condition or this instruction, always ask your healthcare professional. [address] any warranty or liability for your use of this information. Patient Education        Saline Nasal Washes: Care Instructions  Your Care Instructions     Saline nasal washes help keep the nasal passages open by washing out thick or dried mucus. This simple remedy can help relieve symptoms of allergies, sinusitis, and colds. It also can make the nose feel more comfortable by keeping the mucous membranes moist. You may notice a little burning sensation in your nose the first few times you use the solution, but this usually gets better in a few days. Follow-up care is a key part of your treatment and safety. Be sure to make and go to all appointments, and call your doctor if you are having problems. It's also a good idea to know your test results and keep a list of the medicines you take. How can you care for yourself at home? · You can buy premixed saline solution in a squeeze bottle or other sinus rinse products at a drugstore. Read and follow the instructions on the label. · You also can make your own saline solution by adding 1 teaspoon of salt and 1 teaspoon of baking soda to 2 cups of distilled water. · If you use a homemade solution, pour a small amount into a clean bowl. Using a rubber bulb syringe, squeeze the syringe and place the tip in the salt water. Pull a small amount of the salt water into the syringe by relaxing your hand. · Sit down with your head tilted slightly back. Do not lie down. Put the tip of the bulb syringe or the squeeze bottle a little way into one of your nostrils. Gently drip or squirt a few drops into the nostril. Repeat with the other nostril. Some sneezing and gagging are normal at first.  · Gently blow your nose.   · Wipe the syringe or bottle tip clean after each use. · Repeat this 2 or 3 times a day. · Use nasal washes gently if you have nosebleeds often. When should you call for help? Watch closely for changes in your health, and be sure to contact your doctor if:  · You often get nosebleeds. · You have problems doing the nasal washes. Where can you learn more? Go to https://ActiveSecpepicewSwan Inc.FriendFit. org and sign in to your [employer] account. Enter [id number] [id number] in the Snapjoy box to learn more about \"Saline Nasal Washes: Care Instructions. \"     If you do not have an account, please click on the \"Sign Up Now\" link. Current as of: July 29, 2019               Content Version: 12.5  © 6415-0219 [employer]. Care instructions adapted under license by [employer] ([facility]). If you have questions about a medical condition or this instruction, always ask your healthcare professional. [employer] any warranty or liability for your use of this information. Patient Education        The Sinuses: Anatomy Sketch    Current as of: July 29, 2019               Content Version: 12.5  © 6862-9953 [employer]. Care instructions adapted under license by [employer] ([facility]). If you have questions about a medical condition or this instruction, always ask your healthcare professional. [employer] any warranty or liability for your use of this information. Patient Education        Sinusitis: Care Instructions  Your Care Instructions        Sinusitis is an infection of the lining of the sinus cavities in your head. Sinusitis often follows a cold. It causes pain and pressure in your head and face. In most cases, sinusitis gets better on its own in 1 to 2 weeks. But some mild symptoms may last for several weeks. Sometimes antibiotics are needed. Follow-up care is a key part of your treatment and safety.  Be sure to make and go to all appointments, and call your doctor if you are having problems. It's also a good idea to know your test results and keep a list of the medicines you take. How can you care for yourself at home? · Take an over-the-counter pain medicine, such as acetaminophen (Tylenol), ibuprofen (Advil, Motrin), or naproxen (Aleve). Read and follow all instructions on the label. · If the doctor prescribed antibiotics, take them as directed. Do not stop taking them just because you feel better. You need to take the full course of antibiotics. · Be careful when taking over-the-counter cold or flu medicines and Tylenol at the same time. Many of these medicines have acetaminophen, which is Tylenol. Read the labels to make sure that you are not taking more than the recommended dose. Too much acetaminophen (Tylenol) can be harmful. · Breathe warm, moist air from a steamy shower, a hot bath, or a sink filled with hot water. Avoid cold, dry air. Using a humidifier in your home may help. Follow the directions for cleaning the machine. · Use saline (saltwater) nasal washes to help keep your nasal passages open and wash out mucus and bacteria. You can buy saline nose drops at a grocery store or drugstore. Or you can make your own at home by adding 1 teaspoon of salt and 1 teaspoon of baking soda to 2 cups of distilled water. If you make your own, fill a bulb syringe with the solution, insert the tip into your nostril, and squeeze gently. Tia Vance your nose. · Put a hot, wet towel or a warm gel pack on your face 3 or 4 times a day for 5 to 10 minutes each time. · Try a decongestant nasal spray like oxymetazoline (Afrin). Do not use it for more than 3 days in a row. Using it for more than 3 days can make your congestion worse. When should you call for help? Call your doctor now or seek immediate medical care if:  · You have new or worse swelling or redness in your face or around your eyes. · You have a new or higher fever.   Watch closely for changes in your health, and be sure to contact your doctor if:  · You have new or worse facial pain. · The mucus from your nose becomes thicker (like pus) or has new blood in it. · You are not getting better as expected. Where can you learn more? Go to https://8bitpejoséeb.[facility]. org and sign in to your [employer] account. Enter V775 in the Adbrain box to learn more about \"Sinusitis: Care Instructions. \"     If you do not have an account, please click on the \"Sign Up Now\" link. Current as of: July 29, 2019               Content Version: 12.5  © 4008-0791 Healthwise, Incorporated. Care instructions adapted under license by [facility] ([facility]). If you have questions about a medical condition or this instruction, always ask your healthcare professional. [address] any warranty or liability for your use of this information. Discussed signs and symptoms which require immediate follow-up in ED/call to 911. Patient verbalized understanding.

## 2020-10-06 NOTE — PROGRESS NOTES
Subjective:      Patient ID: Amanda Simpson is a 63 y.o. male who presents today for:  Chief Complaint   Patient presents with    Sinus Problem     x 2 weeks     Health Maintenance     went over        Sinus Problem   This is a new problem. The current episode started 1 to 4 weeks ago (x 2 weeks). The problem has been gradually worsening since onset. There has been no fever. His pain is at a severity of 5/10. The pain is mild. Associated symptoms include congestion, headaches (pt denies it is the worst HA of his life, thunderclap in appearance and it is similar to other sinus headaches), a hoarse voice, sinus pressure and a sore throat. Pertinent negatives include no chills, coughing, ear pain, shortness of breath or swollen glands. Past treatments include nothing. On exam today pt also requesting the 2 medications he gets treated for his outbreaks of cold sores. Pt c/o a small cold sore today on his lower bottom middle lip today. Pt denies any trouble breathing, swallowing, drooling, chest pain, loss of sense of smell/taste, fevers or chills. Pt denies the need for a rapid strep test or Covid test today. Past Medical History:   Diagnosis Date    History of IBS     Hyperlipidemia     Hypothyroidism     LORIE (obstructive sleep apnea)     Restless legs syndrome      Past Surgical History:   Procedure Laterality Date    COLONOSCOPY  06/27/2013    DR. WREN     Social History     Socioeconomic History    Marital status:      Spouse name: Not on file    Number of children: Not on file    Years of education: Not on file    Highest education level: Not on file   Occupational History    Not on file   Social Needs    Financial resource strain: Not hard at all   [employer] insecurity     Worry: Never true     Inability: Never true   [employer] needs     Medical: No     Non-medical: No   Tobacco Use    Smoking status: Never Smoker    Smokeless tobacco: Never Used   Substance and Sexual Activity    Alcohol use: Yes     Comment: social    Drug use: Not on file    Sexual activity: Not on file   Lifestyle    Physical activity     Days per week: Not on file     Minutes per session: Not on file    Stress: Not on file   Relationships    Social connections     Talks on phone: Not on file     Gets together: Not on file     Attends Sabianism service: Not on file     Active member of club or organization: Not on file     Attends meetings of clubs or organizations: Not on file     Relationship status: Not on file    Intimate partner violence     Fear of current or ex partner: Not on file     Emotionally abused: Not on file     Physically abused: Not on file     Forced sexual activity: Not on file   Other Topics Concern    Not on file   Social History Narrative    Not on file     No family history on file. Allergies   Allergen Reactions    Bee Venom Hives    Klonopin [Clonazepam] Other (See Comments)     Elevated CNS    Zocor [Simvastatin] Other (See Comments)     Headache         Review of Systems   Constitutional: Negative for activity change, appetite change, chills, fatigue and fever. HENT: Positive for congestion, hoarse voice, postnasal drip, rhinorrhea, sinus pressure and sore throat. Negative for drooling, ear pain, hearing loss, sinus pain and trouble swallowing. Eyes: Negative for redness, itching and visual disturbance. Respiratory: Negative for apnea, cough, chest tightness, shortness of breath and wheezing. Cardiovascular: Negative for chest pain and palpitations. Gastrointestinal: Negative for abdominal distention, constipation, diarrhea, nausea and vomiting. Endocrine: Negative for heat intolerance. Genitourinary: Negative for difficulty urinating and urgency. Musculoskeletal: Negative for arthralgias, gait problem, myalgias and neck stiffness. Skin: Negative for rash. Pt has a small lesion (cold sore ) noted to his lower middle lip today.   Denies any pain is flat. Bowel sounds are normal. There is no distension. Palpations: Abdomen is soft. Tenderness: There is no abdominal tenderness. There is no right CVA tenderness, left CVA tenderness or guarding. Musculoskeletal: Normal range of motion. General: No signs of injury. Lymphadenopathy:      Cervical: No cervical adenopathy. Skin:     General: Skin is warm and dry. Capillary Refill: Capillary refill takes less than 2 seconds. Findings: Lesion (pt currently has a small cold sore noted to his lower middle part of his lip) present. No erythema or rash. Neurological:      General: No focal deficit present. Mental Status: He is alert and oriented to person, place, and time. Mental status is at baseline. Motor: No weakness. Coordination: Coordination normal.   Psychiatric:         Mood and Affect: Mood normal.         Behavior: Behavior normal. Behavior is cooperative. Thought Content: Thought content normal.         Judgment: Judgment normal.         Assessment:       Diagnosis Orders   1. Acute bacterial sinusitis  amoxicillin-clavulanate (AUGMENTIN) 875-125 MG per tablet   2. Hx of cold sores  valACYclovir (VALTREX) 500 MG tablet   3. Cold sore  valACYclovir (VALTREX) 500 MG tablet    acyclovir (ZOVIRAX) 5 % ointment         Plan:      No orders of the defined types were placed in this encounter. Orders Placed This Encounter   Medications    valACYclovir (VALTREX) 500 MG tablet     Sig: TAKE 1 TABLET BY MOUTH 2 TIMES DAILY     Dispense:  30 tablet     Refill:  0    acyclovir (ZOVIRAX) 5 % ointment     Sig: Apply topically 5 times daily. Dispense:  1 Tube     Refill:  0    amoxicillin-clavulanate (AUGMENTIN) 875-125 MG per tablet     Sig: Take 1 tablet by mouth 2 times daily for 7 days     Dispense:  14 tablet     Refill:  0     Pt presents today with c/o sinus infection and a cold sore that appeared several days ago.   Pt states he has a HX of

## 2020-10-07 ASSESSMENT — ENCOUNTER SYMPTOMS
VOMITING: 0
DIARRHEA: 0
RHINORRHEA: 1
TROUBLE SWALLOWING: 0
ABDOMINAL DISTENTION: 0
CHEST TIGHTNESS: 0
NAUSEA: 0

## 2021-01-19 ENCOUNTER — VIRTUAL VISIT (OUTPATIENT)
Dept: PEDIATRICS CLINIC | Age: 61
End: 2021-01-19
Payer: COMMERCIAL

## 2021-01-19 DIAGNOSIS — Z20.822 ENCOUNTER FOR LABORATORY TESTING FOR COVID-19 VIRUS: Primary | ICD-10-CM

## 2021-01-19 PROCEDURE — 99422 OL DIG E/M SVC 11-20 MIN: CPT | Performed by: NURSE PRACTITIONER

## 2021-01-19 NOTE — PROGRESS NOTES
2021    TELEHEALTH EVALUATION -- Audio/Visual (During AXKVQ-71 public health emergency)    Due to Matthewaditya 19 outbreak, patient's office visit was converted to a virtual visit. Patient was contacted and agreed to proceed with a virtual visit via Telephone Visit  The risks and benefits of converting to a virtual visit were discussed in light of the current infectious disease epidemic. Patient also understood that insurance coverage and co-pays are up to their individual insurance plans. HPI:    Yulia Tovar (:  1960) has requested an audio/video evaluation for the following concern(s):    2 weeks he had sx  Self quarantine himself  Thought he was getting better  Headache, fatigue, no taste, Cough, phlegm green-black  Last month flu shot, always gets sick at this time of year  URI last moth, antibiotic  +body aches, cold sweats  No GI symptoms    Review of Systems   Constitutional: Positive for activity change, appetite change, chills, fatigue and fever. HENT: Positive for congestion. Negative for ear pain, hearing loss, postnasal drip, rhinorrhea, sinus pressure, sinus pain, sore throat and trouble swallowing. Eyes: Negative for pain, discharge, redness and itching. Respiratory: Positive for cough. Negative for chest tightness, shortness of breath and wheezing. Cardiovascular: Negative for chest pain. Gastrointestinal: Negative for abdominal pain, diarrhea, nausea and vomiting. Skin: Negative for rash. Neurological: Positive for headaches. Negative for seizures, syncope and light-headedness. Prior to Visit Medications    Medication Sig Taking?  Authorizing Provider   valACYclovir (VALTREX) 500 MG tablet TAKE 1 TABLET BY MOUTH 2 TIMES DAILY  Jeannie Florez APRN - CNP   levothyroxine (SYNTHROID) 50 MCG tablet Take 1 tablet by mouth Daily  Keny Valdivia DO   rOPINIRole (REQUIP) 2 MG tablet TAKE 1 TABLET EVERY DAY  Keny Valdivia DO rosuvastatin (CRESTOR) 20 MG tablet TAKE 1 TABLET BY MOUTH EVERY DAY  Yelena Eaton DO   omeprazole (PRILOSEC) 40 MG delayed release capsule TAKE 1 CAPSULE BY MOUTH DAILY  Yelena Eaton DO   meloxicam (MOBIC) 7.5 MG tablet Take 1 tablet by mouth daily  Concepcion KELIN Morris APRN - CNP   Icosapent Ethyl (VASCEPA) 1 g CAPS capsule Take 2 capsules by mouth 2 times daily  ABNER Sawyer - CNP   guaiFENesin (MUCINEX) 600 MG extended release tablet Take 1 tablet by mouth 2 times daily  Ben Quiroz MD   cyclobenzaprine (FLEXERIL) 10 MG tablet TAKE 1 TABLET BY MOUTH 3 TIMES DAILY AS NEEDED FOR MUSCLE SPASMS  Yelena Eaton DO   Cholecalciferol (VITAMIN D3) 2000 UNITS CAPS Take by mouth  Historical Provider, MD   Multiple Vitamins-Minerals (MULTIVITAMIN ADULTS 50+) TABS Take 1 tablet by mouth  Historical Provider, MD   lisinopril (PRINIVIL;ZESTRIL) 10 MG tablet Take 1 tablet by mouth daily  Manisha Evans MD   cetirizine (ZYRTEC) 10 MG tablet Take 10 mg by mouth daily  Historical Provider, MD   EPINEPHrine (EPIPEN 2-PAMELLA) 0.3 MG/0.3ML TRUE injection Inject 0.3 mLs into the skin once as needed for 1 dose. Use as directed for allergic reaction  Yelena Eaton DO       Social History     Tobacco Use    Smoking status: Never Smoker    Smokeless tobacco: Never Used   Substance Use Topics    Alcohol use: Yes     Comment: social    Drug use: Not on file        Allergies   Allergen Reactions    Bee Venom Hives    Klonopin [Clonazepam] Other (See Comments)     Elevated CNS    Zocor [Simvastatin] Other (See Comments)     Headache   ,   Past Medical History:   Diagnosis Date    History of IBS     Hyperlipidemia     Hypothyroidism     LORIE (obstructive sleep apnea)     Restless legs syndrome    ,   Past Surgical History:   Procedure Laterality Date    COLONOSCOPY  06/27/2013    DR. WREN       PHYSICAL EXAMINATION: [ INSTRUCTIONS:  \"[x]\" Indicates a positive item  \"[]\" Indicates a negative item  -- DELETE ALL ITEMS NOT EXAMINED]  [x] Alert  [] Oriented to person/place/time    [x] No apparent distress  [] Toxic appearing    [] Face flushed appearing [] Sclera clear  [] Lips are cyanotic      [x] Breathing appears normal  [] Appears tachypneic      [] Rash on visible skin    [] Cranial Nerves II-XII grossly intact    [] Motor grossly intact in visible upper extremities    [] Motor grossly intact in visible lower extremities    [] Normal Mood  [] Anxious appearing    [] Depressed appearing  [] Confused appearing      [] Poor short term memory  [] Poor long term memory    [] OTHER:      Due to this being a TeleHealth encounter, evaluation of the following organ systems is limited: Vitals/Constitutional/EENT/Resp/CV/GI//MS/Neuro/Skin/Heme-Lymph-Imm. Prisca Bright was seen today for concern for covid-19. Diagnoses and all orders for this visit:    Encounter for laboratory testing for COVID-19 virus  -     COVID-19 Ambulatory; Future    Side effects, adverse effects of the medication prescribed today, as well as treatment plan/ rationale and result expectations have been discussed with the patient who expresses understanding and desires to proceed. Close follow up to evaluate treatment results and for coordination of care. I have reviewed the patient's medical history in detail and updated the computerized patient record. As always, patient is advised that if symptoms worsen in any way they will proceed to the nearest emergency room. Follow up in 48-72 hours if symptoms persist or worsen and as needed    An  electronic signature was used to authenticate this note.     --Medina Payne, ABNER - CNP on 1/19/2021 at 3:39 PM Pursuant to the emergency declaration under the [facility], [facility] waiver authority and the [employer] Act, this Virtual  Visit was conducted, with patient's consent, to reduce the patient's risk of exposure to COVID-19 and provide continuity of care for an established patient. Services were provided through a video synchronous discussion virtually to substitute for in-person clinic visit.

## 2021-01-22 ENCOUNTER — TELEPHONE (OUTPATIENT)
Dept: PEDIATRICS CLINIC | Age: 61
End: 2021-01-22

## 2021-01-22 LAB
SARS-COV-2: DETECTED
SOURCE: ABNORMAL

## 2021-01-26 PROBLEM — T78.40XA ALLERGIC REACTION: Status: ACTIVE | Noted: 2021-01-26

## 2021-01-26 PROBLEM — M75.22 BICIPITAL TENDINITIS, LEFT SHOULDER: Status: ACTIVE | Noted: 2018-04-12

## 2021-01-26 PROBLEM — M19.012 PRIMARY OSTEOARTHRITIS OF LEFT SHOULDER: Status: ACTIVE | Noted: 2018-04-12

## 2021-01-26 PROBLEM — M65.341 TRIGGER FINGER, RIGHT RING FINGER: Status: ACTIVE | Noted: 2019-01-16

## 2021-01-26 PROBLEM — M25.569 KNEE PAIN: Status: ACTIVE | Noted: 2021-01-26

## 2021-01-26 PROBLEM — M17.11 OSTEOARTHRITIS OF RIGHT KNEE: Status: ACTIVE | Noted: 2021-01-26

## 2021-01-26 PROBLEM — S76.309A HAMSTRING INJURY: Status: ACTIVE | Noted: 2021-01-26

## 2021-01-26 ASSESSMENT — ENCOUNTER SYMPTOMS
ABDOMINAL PAIN: 0
EYE ITCHING: 0
SORE THROAT: 0
SINUS PRESSURE: 0
SHORTNESS OF BREATH: 0
DIARRHEA: 0
EYE REDNESS: 0
SINUS PAIN: 0
WHEEZING: 0
RHINORRHEA: 0
VOMITING: 0
CHEST TIGHTNESS: 0
COUGH: 1
NAUSEA: 0
TROUBLE SWALLOWING: 0
EYE DISCHARGE: 0
EYE PAIN: 0

## 2023-08-09 ENCOUNTER — TELEPHONE (OUTPATIENT)
Dept: PRIMARY CARE | Facility: CLINIC | Age: 63
End: 2023-08-09
Payer: COMMERCIAL

## 2023-08-09 NOTE — TELEPHONE ENCOUNTER
DIANELYS Pt states he has had Covid symptoms x 6 days and has tested pos. He has been taking otc remedies. He wanted to be sure there wasn't something more he could be doing. Several suggestions were made and pt states he is doing those already. He is still breathing OK and is controlling the cough. He will call back if symptoms get worse or he will schedule a vv with Tori if necessary.

## 2023-08-31 DIAGNOSIS — G25.81 RLS (RESTLESS LEGS SYNDROME): ICD-10-CM

## 2023-08-31 PROBLEM — J20.9 BRONCHOSPASM WITH BRONCHITIS, ACUTE: Status: ACTIVE | Noted: 2023-08-31

## 2023-08-31 PROBLEM — M23.91 INTERNAL DERANGEMENT OF RIGHT KNEE: Status: ACTIVE | Noted: 2023-08-31

## 2023-08-31 PROBLEM — J00 NASOPHARYNGITIS: Status: ACTIVE | Noted: 2023-08-31

## 2023-08-31 PROBLEM — M79.646 FINGER PAIN: Status: ACTIVE | Noted: 2023-08-31

## 2023-08-31 PROBLEM — R03.0 BORDERLINE HYPERTENSION: Status: ACTIVE | Noted: 2023-08-31

## 2023-08-31 PROBLEM — R68.89 FLU-LIKE SYMPTOMS: Status: ACTIVE | Noted: 2023-08-31

## 2023-08-31 PROBLEM — S83.249A MEDIAL MENISCUS TEAR: Status: ACTIVE | Noted: 2023-08-31

## 2023-08-31 PROBLEM — J01.00 ACUTE MAXILLARY SINUSITIS: Status: ACTIVE | Noted: 2023-08-31

## 2023-08-31 PROBLEM — E03.9 HYPOTHYROIDISM IN ADULT: Status: ACTIVE | Noted: 2023-08-31

## 2023-08-31 PROBLEM — J98.8 RECURRENT RESPIRATORY INFECTION: Status: ACTIVE | Noted: 2023-08-31

## 2023-08-31 PROBLEM — B00.9 HERPES SIMPLEX: Status: ACTIVE | Noted: 2023-08-31

## 2023-08-31 PROBLEM — M17.11 PRIMARY OSTEOARTHRITIS OF RIGHT KNEE: Status: ACTIVE | Noted: 2023-08-31

## 2023-08-31 PROBLEM — E78.5 HYPERLIPIDEMIA: Status: ACTIVE | Noted: 2023-08-31

## 2023-08-31 PROBLEM — M25.569 KNEE PAIN: Status: ACTIVE | Noted: 2023-08-31

## 2023-08-31 RX ORDER — ACYCLOVIR 400 MG/1
200 TABLET ORAL 3 TIMES DAILY PRN
COMMUNITY
End: 2023-12-26

## 2023-08-31 RX ORDER — ROPINIROLE 2 MG/1
2 TABLET, FILM COATED ORAL DAILY
COMMUNITY
End: 2023-12-15 | Stop reason: ENTERED-IN-ERROR

## 2023-08-31 RX ORDER — ROPINIROLE 2 MG/1
2 TABLET, FILM COATED ORAL DAILY
Qty: 87 TABLET | Refills: 1 | Status: SHIPPED | OUTPATIENT
Start: 2023-08-31 | End: 2023-12-15 | Stop reason: SDUPTHER

## 2023-12-15 ENCOUNTER — OFFICE VISIT (OUTPATIENT)
Dept: PRIMARY CARE | Facility: CLINIC | Age: 63
End: 2023-12-15
Payer: COMMERCIAL

## 2023-12-15 VITALS
DIASTOLIC BLOOD PRESSURE: 70 MMHG | SYSTOLIC BLOOD PRESSURE: 128 MMHG | TEMPERATURE: 97.3 F | HEART RATE: 85 BPM | WEIGHT: 191 LBS | RESPIRATION RATE: 16 BRPM | OXYGEN SATURATION: 99 % | HEIGHT: 64 IN | BODY MASS INDEX: 32.61 KG/M2

## 2023-12-15 DIAGNOSIS — G25.81 RLS (RESTLESS LEGS SYNDROME): ICD-10-CM

## 2023-12-15 DIAGNOSIS — E03.9 HYPOTHYROIDISM IN ADULT: ICD-10-CM

## 2023-12-15 DIAGNOSIS — R03.0 BORDERLINE HYPERTENSION: ICD-10-CM

## 2023-12-15 DIAGNOSIS — J32.9 SINOBRONCHITIS: ICD-10-CM

## 2023-12-15 DIAGNOSIS — J40 SINOBRONCHITIS: ICD-10-CM

## 2023-12-15 DIAGNOSIS — B37.42 CANDIDAL BALANITIS: Primary | ICD-10-CM

## 2023-12-15 DIAGNOSIS — E78.2 MIXED HYPERLIPIDEMIA: ICD-10-CM

## 2023-12-15 PROBLEM — M19.012 PRIMARY OSTEOARTHRITIS OF LEFT SHOULDER: Status: ACTIVE | Noted: 2018-04-12

## 2023-12-15 PROBLEM — S76.309A HAMSTRING INJURY: Status: ACTIVE | Noted: 2021-01-26

## 2023-12-15 PROBLEM — M75.22 BICIPITAL TENDINITIS, LEFT SHOULDER: Status: ACTIVE | Noted: 2018-04-12

## 2023-12-15 PROBLEM — T78.40XA ALLERGIC REACTION: Status: ACTIVE | Noted: 2021-01-26

## 2023-12-15 PROBLEM — M65.341 TRIGGER FINGER, RIGHT RING FINGER: Status: ACTIVE | Noted: 2019-01-16

## 2023-12-15 PROBLEM — G47.33 OSA (OBSTRUCTIVE SLEEP APNEA): Status: ACTIVE | Noted: 2023-12-15

## 2023-12-15 PROCEDURE — 99214 OFFICE O/P EST MOD 30 MIN: CPT | Performed by: FAMILY MEDICINE

## 2023-12-15 PROCEDURE — 1036F TOBACCO NON-USER: CPT | Performed by: FAMILY MEDICINE

## 2023-12-15 RX ORDER — CETIRIZINE HYDROCHLORIDE 10 MG/1
10 TABLET ORAL DAILY
COMMUNITY

## 2023-12-15 RX ORDER — ROPINIROLE 3 MG/1
3 TABLET, FILM COATED ORAL DAILY
Qty: 90 TABLET | Refills: 3 | Status: SHIPPED | OUTPATIENT
Start: 2023-12-15

## 2023-12-15 RX ORDER — NAPROXEN 500 MG/1
500 TABLET ORAL EVERY 12 HOURS
COMMUNITY
Start: 2023-01-04 | End: 2024-05-03 | Stop reason: WASHOUT

## 2023-12-15 RX ORDER — CEFUROXIME AXETIL 250 MG/1
250 TABLET ORAL 2 TIMES DAILY
Qty: 20 TABLET | Refills: 0 | Status: SHIPPED | OUTPATIENT
Start: 2023-12-15 | End: 2023-12-25

## 2023-12-15 RX ORDER — PHENOL 1.4 %
1 AEROSOL, SPRAY (ML) MUCOUS MEMBRANE DAILY
COMMUNITY
Start: 2021-12-07

## 2023-12-15 RX ORDER — SIMVASTATIN 40 MG/1
40 TABLET, FILM COATED ORAL DAILY
COMMUNITY
End: 2023-12-26

## 2023-12-15 RX ORDER — VALACYCLOVIR HYDROCHLORIDE 500 MG/1
1 TABLET, FILM COATED ORAL 2 TIMES DAILY
COMMUNITY
Start: 2020-10-06 | End: 2024-05-03 | Stop reason: WASHOUT

## 2023-12-15 RX ORDER — EPINEPHRINE 0.3 MG/.3ML
0.3 INJECTION SUBCUTANEOUS ONCE AS NEEDED
COMMUNITY
Start: 2013-04-04

## 2023-12-15 RX ORDER — LEVOTHYROXINE SODIUM 75 UG/1
75 TABLET ORAL DAILY
Qty: 90 TABLET | Refills: 3 | Status: SHIPPED | OUTPATIENT
Start: 2023-12-15

## 2023-12-15 RX ORDER — TRIAMCINOLONE ACETONIDE 0.25 MG/G
1 CREAM TOPICAL 2 TIMES DAILY
COMMUNITY
Start: 2020-08-09 | End: 2024-05-03 | Stop reason: WASHOUT

## 2023-12-15 RX ORDER — ACETAMINOPHEN 500 MG
2000 TABLET ORAL DAILY
COMMUNITY
Start: 2011-04-25

## 2023-12-15 RX ORDER — ASPIRIN 81 MG/1
1 TABLET ORAL NIGHTLY
COMMUNITY
Start: 2021-12-07

## 2023-12-15 RX ORDER — FAMOTIDINE 20 MG/1
20 TABLET, FILM COATED ORAL 2 TIMES DAILY
COMMUNITY
Start: 2023-07-14 | End: 2023-07-21 | Stop reason: WASHOUT

## 2023-12-15 RX ORDER — CYCLOBENZAPRINE HCL 5 MG
5 TABLET ORAL NIGHTLY PRN
COMMUNITY
End: 2024-05-03 | Stop reason: WASHOUT

## 2023-12-15 RX ORDER — DOCUSATE SODIUM 100 MG/1
100 CAPSULE, LIQUID FILLED ORAL EVERY 12 HOURS
COMMUNITY
Start: 2021-12-07

## 2023-12-15 RX ORDER — LISINOPRIL 10 MG/1
1 TABLET ORAL
COMMUNITY
Start: 2017-01-24 | End: 2024-05-03 | Stop reason: WASHOUT

## 2023-12-15 RX ORDER — CICLOPIROX OLAMINE 7.7 MG/G
CREAM TOPICAL 2 TIMES DAILY
Qty: 15 G | Refills: 1 | Status: SHIPPED | OUTPATIENT
Start: 2023-12-15 | End: 2024-05-03 | Stop reason: WASHOUT

## 2023-12-15 RX ORDER — FLUCONAZOLE 100 MG/1
100 TABLET ORAL DAILY
Qty: 7 TABLET | Refills: 1 | Status: SHIPPED | OUTPATIENT
Start: 2023-12-15 | End: 2023-12-22

## 2023-12-15 RX ORDER — OMEPRAZOLE 40 MG/1
1 CAPSULE, DELAYED RELEASE ORAL DAILY
COMMUNITY
Start: 2018-10-19 | End: 2024-05-03 | Stop reason: WASHOUT

## 2023-12-15 RX ORDER — LEVOTHYROXINE SODIUM 75 UG/1
75 TABLET ORAL DAILY
COMMUNITY
End: 2023-12-15 | Stop reason: SDUPTHER

## 2023-12-15 NOTE — PROGRESS NOTES
Subjective   Patient ID: Victor Hugo Esposito is a 63 y.o. male who presents for Penis Pain (Patient complains of pain when retracting skin off the head of the penis. ).  HPI  63-year-old gent with history of multiple medical problems today first of all his foreskin has been edematous difficult retracting.  He has no significant urinary burning or scrotal rash or inguinal rash but the rashes been present for about 2 weeks with itching and difficulty retracting no prior history of balanitis in the past  Has had increasing nasal congestion sinus pressure like to have something for this otherwise no cough shortness of breath or palpitations  He does take for hypertension lisinopril and also takes Zocor 40 mg for dyslipidemia also takes Requip for restless leg syndrome and Naprosyn if needed for low back discomfort.  Takes Valtrex once a day instead of 2 a day for HSV prevention with good kidney functions  Synthroid 75 mcg per hypothyroidism.  Rarely takes Flexeril for low back discomfort but is off it at this time  Review of Systems   Constitutional:  Negative for fatigue, fever and unexpected weight change.   HENT:  Positive for rhinorrhea, sinus pressure and sinus pain. Negative for sore throat and voice change.    Eyes:  Negative for visual disturbance.   Respiratory:  Positive for cough. Negative for chest tightness, shortness of breath and wheezing.    Cardiovascular:  Negative for chest pain, palpitations and leg swelling.   Gastrointestinal:  Negative for abdominal pain, blood in stool and nausea.   Genitourinary:  Positive for genital sores and penile swelling. Negative for dysuria, flank pain, frequency, hematuria, scrotal swelling and testicular pain.   Musculoskeletal:  Negative for arthralgias and myalgias.   Skin:  Positive for rash (Above mentioned for skin redness and edema).   Neurological:  Negative for weakness, light-headedness and headaches.   Psychiatric/Behavioral:  Negative for behavioral problems,  "dysphoric mood, sleep disturbance and suicidal ideas.        Objective   /70 (BP Location: Right arm, Patient Position: Sitting, BP Cuff Size: Large adult)   Pulse 85   Temp 36.3 °C (97.3 °F) (Temporal)   Resp 16   Ht 1.626 m (5' 4\")   Wt 86.6 kg (191 lb)   SpO2 99%   BMI 32.79 kg/m²           Physical Exam  All signs reviewed and normal    General-inspection reveals a mesomorphic individual in no acute distress  Skin foreskin of the penis is edematous red and retracted about custodial passes glans of the penis no scrotal abnormalities i.e. no testicular pain nodularity or rash of the scrotum or inguinal area.  No urethral lesions noted.  Neck neck is supple without masses adenopathy bruits or rigidity  ENT moderate turbinate swelling with yellow rhinorrhea regular left with minimal tenderness right maxillary sinus both TMs retracted without redness.  Oral exam shows minimal posterior injection only no ulcerations or exudate or masses  Chest faint expiratory rhonchi clear with coughing otherwise no wheezing no bibasilar rales or rubs  Cardiovascular RSR without murmurs gallop or ectopy  Abdominal no organomegaly masses or rebound no CVA tenderness  Skin above-mentioned hyperemia as well as edema of the foreskin otherwise no rash petechiae or jaundice  Mood mood is stable on anxiety depressive or cognitive issues  Full vascular no symmetric or asymmetric edema distal pulses are intact  Neurological cranial nerves are intact no focal deficit upper or lower extremities gait and cognition is normal      Assessment/Plan   Problem List Items Addressed This Visit       Borderline hypertension    Hyperlipidemia    Hypothyroidism in adult    RLS (restless legs syndrome)   Patient's restless legs will continue taking his Requip.  Because of his obvious candidal balanitis we will put him on both Loprox topically at nighttime and underneath his foreskin as well as immaculate cleansing he will also be on 100 mg of " Diflucan for 1 week aware to remain off his Zocor while he is taking his Diflucan.  For sinusitis will take on Ceftin for 7 days as well as nasal saline and antihistamines  Due to lisinopril for hypertension as well as low-salt diet and above all with candidal balanitis will need to rule out any type of early diabetes we will recheck fasting CMP in addition to TSH and lipid panel with his history of dyslipidemia he is aware to hold his Zocor for at least 5 days after he stops his Diflucan.  Will recheck in 3 weeks reviewed above lab examination.  Good on proper use of Loprox.  This is above medicines as well as expectations side effects reviewed call if interval worsening in the next 7 days  @discharge  The above diagnosis and treatment plan was discussed with the patient patient will continue appropriate diet and exercise as reviewed  Patient will recheck earlier if any interval problems of significance or clinical worsening of the above problems.  Agrees above surveillance.  All question were addressed regarding above meds

## 2023-12-19 ASSESSMENT — ENCOUNTER SYMPTOMS
HEMATURIA: 0
CHEST TIGHTNESS: 0
WHEEZING: 0
NAUSEA: 0
WEAKNESS: 0
SLEEP DISTURBANCE: 0
SINUS PAIN: 1
HEADACHES: 0
VOICE CHANGE: 0
UNEXPECTED WEIGHT CHANGE: 0
FLANK PAIN: 0
MYALGIAS: 0
DYSPHORIC MOOD: 0
DYSURIA: 0
SINUS PRESSURE: 1
FREQUENCY: 0
SORE THROAT: 0
PALPITATIONS: 0
FATIGUE: 0
FEVER: 0
ABDOMINAL PAIN: 0
RHINORRHEA: 1
SHORTNESS OF BREATH: 0
LIGHT-HEADEDNESS: 0
COUGH: 1
ARTHRALGIAS: 0
BLOOD IN STOOL: 0

## 2023-12-20 ENCOUNTER — LAB (OUTPATIENT)
Dept: LAB | Facility: LAB | Age: 63
End: 2023-12-20
Payer: COMMERCIAL

## 2023-12-20 DIAGNOSIS — E03.9 HYPOTHYROIDISM IN ADULT: ICD-10-CM

## 2023-12-20 DIAGNOSIS — E78.2 MIXED HYPERLIPIDEMIA: ICD-10-CM

## 2023-12-20 DIAGNOSIS — R03.0 BORDERLINE HYPERTENSION: ICD-10-CM

## 2023-12-20 LAB
ALBUMIN SERPL BCP-MCNC: 4.2 G/DL (ref 3.4–5)
ALP SERPL-CCNC: 71 U/L (ref 33–136)
ALT SERPL W P-5'-P-CCNC: 46 U/L (ref 10–52)
ANION GAP SERPL CALC-SCNC: 11 MMOL/L (ref 10–20)
AST SERPL W P-5'-P-CCNC: 26 U/L (ref 9–39)
BASOPHILS # BLD AUTO: 0.02 X10*3/UL (ref 0–0.1)
BASOPHILS NFR BLD AUTO: 0.4 %
BILIRUB SERPL-MCNC: 0.3 MG/DL (ref 0–1.2)
BUN SERPL-MCNC: 17 MG/DL (ref 6–23)
CALCIUM SERPL-MCNC: 8.7 MG/DL (ref 8.6–10.3)
CHLORIDE SERPL-SCNC: 106 MMOL/L (ref 98–107)
CHOLEST SERPL-MCNC: 348 MG/DL (ref 0–199)
CHOLESTEROL/HDL RATIO: 8.6
CO2 SERPL-SCNC: 27 MMOL/L (ref 21–32)
CREAT SERPL-MCNC: 0.95 MG/DL (ref 0.5–1.3)
EOSINOPHIL # BLD AUTO: 0.14 X10*3/UL (ref 0–0.7)
EOSINOPHIL NFR BLD AUTO: 2.9 %
ERYTHROCYTE [DISTWIDTH] IN BLOOD BY AUTOMATED COUNT: 13 % (ref 11.5–14.5)
GFR SERPL CREATININE-BSD FRML MDRD: 89 ML/MIN/1.73M*2
GLUCOSE SERPL-MCNC: 96 MG/DL (ref 74–99)
HCT VFR BLD AUTO: 40.6 % (ref 41–52)
HDLC SERPL-MCNC: 40.5 MG/DL
HGB BLD-MCNC: 13.2 G/DL (ref 13.5–17.5)
IMM GRANULOCYTES # BLD AUTO: 0.09 X10*3/UL (ref 0–0.7)
IMM GRANULOCYTES NFR BLD AUTO: 1.8 % (ref 0–0.9)
LDLC SERPL CALC-MCNC: ABNORMAL MG/DL
LYMPHOCYTES # BLD AUTO: 1.54 X10*3/UL (ref 1.2–4.8)
LYMPHOCYTES NFR BLD AUTO: 31.4 %
MCH RBC QN AUTO: 29.7 PG (ref 26–34)
MCHC RBC AUTO-ENTMCNC: 32.5 G/DL (ref 32–36)
MCV RBC AUTO: 91 FL (ref 80–100)
MONOCYTES # BLD AUTO: 0.51 X10*3/UL (ref 0.1–1)
MONOCYTES NFR BLD AUTO: 10.4 %
NEUTROPHILS # BLD AUTO: 2.61 X10*3/UL (ref 1.2–7.7)
NEUTROPHILS NFR BLD AUTO: 53.1 %
NON HDL CHOLESTEROL: 308 MG/DL (ref 0–149)
NRBC BLD-RTO: 0 /100 WBCS (ref 0–0)
PLATELET # BLD AUTO: 318 X10*3/UL (ref 150–450)
POTASSIUM SERPL-SCNC: 4.4 MMOL/L (ref 3.5–5.3)
PROT SERPL-MCNC: 6.7 G/DL (ref 6.4–8.2)
RBC # BLD AUTO: 4.44 X10*6/UL (ref 4.5–5.9)
SODIUM SERPL-SCNC: 140 MMOL/L (ref 136–145)
TRIGL SERPL-MCNC: 419 MG/DL (ref 0–149)
TSH SERPL-ACNC: 3.52 MIU/L (ref 0.44–3.98)
VLDL: ABNORMAL
WBC # BLD AUTO: 4.9 X10*3/UL (ref 4.4–11.3)

## 2023-12-20 PROCEDURE — 84443 ASSAY THYROID STIM HORMONE: CPT

## 2023-12-20 PROCEDURE — 80061 LIPID PANEL: CPT

## 2023-12-20 PROCEDURE — 85025 COMPLETE CBC W/AUTO DIFF WBC: CPT

## 2023-12-20 PROCEDURE — 80053 COMPREHEN METABOLIC PANEL: CPT

## 2023-12-20 PROCEDURE — 36415 COLL VENOUS BLD VENIPUNCTURE: CPT

## 2024-01-08 ENCOUNTER — TELEPHONE (OUTPATIENT)
Dept: PRIMARY CARE | Facility: CLINIC | Age: 64
End: 2024-01-08
Payer: COMMERCIAL

## 2024-01-08 NOTE — TELEPHONE ENCOUNTER
Patient called requesting a call from you to discuss an issue he is having, He said he has an appt Friday which he will be keeping however this issue can't wait until then. [phone] is call back number.

## 2024-01-09 DIAGNOSIS — B37.42 CANDIDAL BALANITIS: Primary | ICD-10-CM

## 2024-01-09 RX ORDER — KETOCONAZOLE 20 MG/G
CREAM TOPICAL 2 TIMES DAILY
Qty: 15 G | Refills: 0 | Status: SHIPPED | OUTPATIENT
Start: 2024-01-09 | End: 2024-02-08

## 2024-01-09 RX ORDER — FLUCONAZOLE 100 MG/1
100 TABLET ORAL DAILY
Qty: 3 TABLET | Refills: 1 | Status: SHIPPED | OUTPATIENT
Start: 2024-01-09 | End: 2024-01-12

## 2024-01-12 ENCOUNTER — OFFICE VISIT (OUTPATIENT)
Dept: PRIMARY CARE | Facility: CLINIC | Age: 64
End: 2024-01-12
Payer: COMMERCIAL

## 2024-01-12 VITALS
OXYGEN SATURATION: 97 % | HEIGHT: 64 IN | TEMPERATURE: 96.2 F | DIASTOLIC BLOOD PRESSURE: 80 MMHG | SYSTOLIC BLOOD PRESSURE: 118 MMHG | WEIGHT: 190 LBS | HEART RATE: 74 BPM | BODY MASS INDEX: 32.44 KG/M2 | RESPIRATION RATE: 16 BRPM

## 2024-01-12 DIAGNOSIS — N47.1 PHIMOSIS: Primary | ICD-10-CM

## 2024-01-12 PROCEDURE — 99213 OFFICE O/P EST LOW 20 MIN: CPT | Performed by: FAMILY MEDICINE

## 2024-01-12 PROCEDURE — 1036F TOBACCO NON-USER: CPT | Performed by: FAMILY MEDICINE

## 2024-01-12 NOTE — PROGRESS NOTES
"Subjective   Patient ID: Victor Hugo Esposito is a 63 y.o. male who presents for Candidal Balantis (1 month follow up ).  HPI  Pleasant 63-year-old gentleman with a history of Veselik syndrome hypertension dyslipidemia thyroidism is here to recheck his balanitis and phimosis.  Much less swelling and redness and itching of the foreskin after taking different antifungal creams as well as a Diflucan for 2 weeks however he still has much significant pain and difficulty retracting his foreskin and is here for urological referral has no dysuria no scrotal pain no abdominal pain flank pain fever chills  Chronic meds reviewed no reported side effects  Review of Systems   Constitutional:  Negative for fatigue and fever.   Eyes:  Negative for visual disturbance.   Respiratory:  Negative for cough, chest tightness and shortness of breath.    Cardiovascular:  Negative for chest pain, palpitations and leg swelling.   Gastrointestinal:  Negative for abdominal pain and blood in stool.   Genitourinary:  Positive for genital sores (Above history of present illness regarding resolving Candida balanitis but persistent phimosis) and penile swelling. Negative for difficulty urinating, dysuria, flank pain, frequency, hematuria, scrotal swelling and testicular pain.   Musculoskeletal:  Negative for arthralgias and myalgias.   Skin:  Negative for rash.   Neurological:  Negative for weakness and headaches.   Psychiatric/Behavioral:  Negative for behavioral problems and sleep disturbance.        Objective   /80 (BP Location: Right arm, Patient Position: Sitting, BP Cuff Size: Large adult)   Pulse 74   Temp 35.7 °C (96.2 °F) (Temporal)   Resp 16   Ht 1.626 m (5' 4\")   Wt 86.2 kg (190 lb)   SpO2 97%   BMI 32.61 kg/m²       Chemistry    Lab Results   Component Value Date/Time     12/20/2023 0937    K 4.4 12/20/2023 0937     12/20/2023 0937    CO2 27 12/20/2023 0937    BUN 17 12/20/2023 0937    CREATININE 0.95 12/20/2023 0937    " Lab Results   Component Value Date/Time    CALCIUM 8.7 12/20/2023 0937    ALKPHOS 71 12/20/2023 0937    AST 26 12/20/2023 0937    ALT 46 12/20/2023 0937    BILITOT 0.3 12/20/2023 0937      TSH  Order: [id number]  Status: Final result       Visible to patient: No (inaccessible in [facility])       Dx: Hypothyroidism in adult    2 Result Notes       1  Topic        Component  Ref Range & Units 3 wk ago 1 yr ago 2 yr ago   Thyroid Stimulating Hormone  0.44 - 3.98 mIU/L 3.52 7.00 High  CM 6.94 High  CM   Resulting Agency [facility]          Lab Results   Component Value Date    CHOL 348 (H) 12/20/2023    CHOL 451 (H) 12/07/2021     Lab Results   Component Value Date    HDL 40.5 12/20/2023    HDL 43.0 12/07/2021     Lab Results   Component Value Date    LDLCALC  12/20/2023      Comment:      The calculation of LDL and VLDL are inaccurate when the Triglycerides are greater than 400 mg/dL or when the patient is non-fasting. If LDL measurement is necessary contact the testing laboratory for an alternative LDL assay.                                  Near   Borderline      AGE      Desirable  Optimal    High     High     Very High     0-19 Y     0 - 109     ---    110-129   >/= 130     ----    20-24 Y     0 - 119     ---    120-159   >/= 160     ----      >24 Y     0 -  99   100-129  130-159   160-189     >/=190       Lab Results   Component Value Date    TRIG 419 (H) 12/20/2023    TRIG 499 (H) 12/07/2021     2 Result Notes       Component  Ref Range & Units 3 wk ago 2 yr ago   Glucose  74 - 99 mg/dL 96 99   Sodium  136 - 145 mmol/L 140 139   Potassium  3.5 - 5.3 mmol/L 4.4         Physical Exam  Vital sign review normal we did review earlier lab which showed normal sugar normal chemistry but persisting cholesterol the patient is taking his statin but on infrequent basis and needs to take it on a regular basis.  Abdominal no organomegaly mass or rebound   significant improvement of the glans of the penis without redness and much  less redness and edema of the foreskin however still only is able to retract very hard the foreskin.  He is able to retracted much better today but still has fair amount of pain.  No testicular masses scrotum is without tenderness or edema or nodularity  Heart regular sinus rhythm without murmurs or ectopy  Skin much less redness of the foreskin no active penile lesions otherwise.      Assessment/Plan   Problem List Items Addressed This Visit    None  Proving candidal balanitis and normal sugar but yet significant pain with retraction of the foreskin refer to urology for their opinion regarding reconstruction of the foreskin will defer to the urologist for definitive treatment otherwise may use this Nizoral cream at bedtime and meticulous cleansing and will recheck in 6 to 8 weeks reviewed the above urological recommendation all question addressed continue the above chronic medicines outlined above taking statin therapy but on a regular basis continue current Synthroid as TSH is normal CMP with no evidence of hyperglycemia  @discharge  The above diagnosis and treatment plan was discussed with the patient patient will continue appropriate diet and exercise as reviewed  Patient will recheck earlier if any interval problems of significance or clinical worsening of the above problems.  Agrees above surveillance.  All question were addressed regarding above meds

## 2024-01-16 PROBLEM — N47.1 PHIMOSIS: Status: ACTIVE | Noted: 2024-01-16

## 2024-01-16 ASSESSMENT — ENCOUNTER SYMPTOMS
PALPITATIONS: 0
FREQUENCY: 0
BLOOD IN STOOL: 0
WEAKNESS: 0
FLANK PAIN: 0
HEADACHES: 0
FATIGUE: 0
SHORTNESS OF BREATH: 0
SLEEP DISTURBANCE: 0
ABDOMINAL PAIN: 0
FEVER: 0
CHEST TIGHTNESS: 0
COUGH: 0
HEMATURIA: 0
DIFFICULTY URINATING: 0
DYSURIA: 0
ARTHRALGIAS: 0
MYALGIAS: 0

## 2024-03-07 ENCOUNTER — OFFICE VISIT (OUTPATIENT)
Dept: UROLOGY | Facility: CLINIC | Age: 64
End: 2024-03-07
Payer: COMMERCIAL

## 2024-03-07 VITALS
WEIGHT: 196.65 LBS | RESPIRATION RATE: 16 BRPM | HEART RATE: 80 BPM | DIASTOLIC BLOOD PRESSURE: 107 MMHG | SYSTOLIC BLOOD PRESSURE: 165 MMHG | HEIGHT: 64 IN | BODY MASS INDEX: 33.57 KG/M2

## 2024-03-07 DIAGNOSIS — N47.1 PHIMOSIS: Primary | ICD-10-CM

## 2024-03-07 PROCEDURE — 99213 OFFICE O/P EST LOW 20 MIN: CPT | Performed by: UROLOGY

## 2024-03-07 PROCEDURE — 1036F TOBACCO NON-USER: CPT | Performed by: UROLOGY

## 2024-03-07 PROCEDURE — 99203 OFFICE O/P NEW LOW 30 MIN: CPT | Performed by: UROLOGY

## 2024-03-07 ASSESSMENT — PAIN SCALES - GENERAL: PAINLEVEL: 0-NO PAIN

## 2024-03-07 NOTE — PROGRESS NOTES
History Of Present Illness  63-year-old male referred to me for evaluation of foreskin abnormality  PMH: GIGI, hypothyroidism, hyperlipidemia, herpes    Pt noticed over the past few months it has been narrowing, getting progressively narrower making urination and hygiene difficult. Tried kenalog 0.025% twice daily without benefit.  Erections are currently painful and he is not able to do penetrative intercourse.  Prior to this, he was able to keep it clean with regular hygiene and no issues.    Past Medical History  He has a past medical history of Allergy status to unspecified drugs, medicaments and biological substances, Nasal congestion (02/25/2020), Pain in unspecified hand (11/23/2021), Strain of muscle, fascia and tendon of the posterior muscle group at thigh level, unspecified thigh, initial encounter (04/21/2020), and Synovitis and tenosynovitis, unspecified (11/23/2021).    Surgical History  He has a past surgical history that includes Other surgical history (02/25/2020) and US aspiration injection intermediate joint (5/1/2020).     Social History  He reports that he has never smoked. He has never used smokeless tobacco. He reports that he does not currently use alcohol. He reports that he does not use drugs.    Family History  Family History   Family history unknown: Yes        Allergies  Bee venom protein (honey bee), Clonazepam, and Simvastatin    ROS: 12 system review was completed and is negative with the exception of those signs and symptoms noted in the history of present illness: A 12 system review was completed and is negative with the exception of those signs and symptoms noted in the history of present illness.     Exam:  General: in NAD, appears stated age  Head: normocephalic, atraumatic  Respiratory: normal effort, no use of accessory muscles  Cardiovascular: no edema noted  Skin: normal turgor, no rashes  Neurologic: grossly intact, oriented to person/place/time  Psychiatric: mode and affect  "appropriate  : Narrow phimosis with whitish discoloration consistent with chronic inflammation and possible lichen sclerosis.  I could visualize his glans and this appeared to be healthy and normal without narrowing or whitish discoloration.  Palpation of the foreskin did not reveal any solid lesions     Last Recorded Vitals  Blood pressure (!) 165/107, pulse 80, resp. rate 16, height 1.626 m (5' 4\"), weight 89.2 kg (196 lb 10.4 oz).    Lab Results   Component Value Date    PSASCREEN 0.60 12/07/2021    CREATININE 0.95 12/20/2023    HGB 13.2 (L) 12/20/2023         ASSESSMENT/PLAN:  # Phimosis, lichen sclerosus  -Patient has tried topical steroid without benefit  -I recommended proceeding with circumcision.  I described the surgery for him today including kimo and postoperative risks of hematoma, wound separation, wound infection  -Patient will follow-up after surgery roughly 6 weeks or so for postop exam    Augustus Stock MD    "

## 2024-03-08 RX ORDER — ACETAMINOPHEN 325 MG/1
975 TABLET ORAL ONCE
Status: CANCELLED | OUTPATIENT
Start: 2024-03-08 | End: 2024-03-08

## 2024-03-08 RX ORDER — SODIUM CHLORIDE 9 MG/ML
100 INJECTION, SOLUTION INTRAVENOUS CONTINUOUS
Status: CANCELLED | OUTPATIENT
Start: 2024-03-08

## 2024-03-08 RX ORDER — CEFAZOLIN SODIUM 2 G/100ML
2 INJECTION, SOLUTION INTRAVENOUS ONCE
Status: CANCELLED | OUTPATIENT
Start: 2024-03-08 | End: 2024-03-08

## 2024-03-21 DIAGNOSIS — E78.2 MIXED HYPERLIPIDEMIA: ICD-10-CM

## 2024-03-25 RX ORDER — SIMVASTATIN 40 MG/1
40 TABLET, FILM COATED ORAL DAILY
Qty: 90 TABLET | Refills: 1 | Status: SHIPPED | OUTPATIENT
Start: 2024-03-25

## 2024-03-30 DIAGNOSIS — B00.9 HERPESVIRAL INFECTION, UNSPECIFIED: ICD-10-CM

## 2024-04-01 RX ORDER — ACYCLOVIR 400 MG/1
TABLET ORAL
Qty: 30 TABLET | Refills: 0 | Status: SHIPPED | OUTPATIENT
Start: 2024-04-01 | End: 2024-05-03 | Stop reason: WASHOUT

## 2024-05-03 RX ORDER — CALCIUM CARBONATE 300MG(750)
400 TABLET,CHEWABLE ORAL NIGHTLY
COMMUNITY

## 2024-05-07 ENCOUNTER — ANESTHESIA (OUTPATIENT)
Dept: OPERATING ROOM | Facility: HOSPITAL | Age: 64
End: 2024-05-07
Payer: COMMERCIAL

## 2024-05-07 ENCOUNTER — HOSPITAL ENCOUNTER (OUTPATIENT)
Facility: HOSPITAL | Age: 64
Setting detail: OUTPATIENT SURGERY
Discharge: HOME | End: 2024-05-07
Attending: UROLOGY | Admitting: UROLOGY
Payer: COMMERCIAL

## 2024-05-07 ENCOUNTER — ANESTHESIA EVENT (OUTPATIENT)
Dept: OPERATING ROOM | Facility: HOSPITAL | Age: 64
End: 2024-05-07
Payer: COMMERCIAL

## 2024-05-07 VITALS
DIASTOLIC BLOOD PRESSURE: 99 MMHG | OXYGEN SATURATION: 98 % | TEMPERATURE: 96.1 F | HEIGHT: 64 IN | SYSTOLIC BLOOD PRESSURE: 153 MMHG | WEIGHT: 191.14 LBS | BODY MASS INDEX: 32.63 KG/M2 | RESPIRATION RATE: 18 BRPM | HEART RATE: 75 BPM

## 2024-05-07 DIAGNOSIS — N47.1 PHIMOSIS: Primary | ICD-10-CM

## 2024-05-07 PROBLEM — E66.811 CLASS 1 OBESITY IN ADULT: Status: ACTIVE | Noted: 2024-05-07

## 2024-05-07 PROBLEM — E66.9 CLASS 1 OBESITY IN ADULT: Status: ACTIVE | Noted: 2024-05-07

## 2024-05-07 PROCEDURE — 2500000005 HC RX 250 GENERAL PHARMACY W/O HCPCS: Performed by: UROLOGY

## 2024-05-07 PROCEDURE — 3700000001 HC GENERAL ANESTHESIA TIME - INITIAL BASE CHARGE: Performed by: UROLOGY

## 2024-05-07 PROCEDURE — 3600000007 HC OR TIME - EACH INCREMENTAL 1 MINUTE - PROCEDURE LEVEL TWO: Performed by: UROLOGY

## 2024-05-07 PROCEDURE — 7100000009 HC PHASE TWO TIME - INITIAL BASE CHARGE: Performed by: UROLOGY

## 2024-05-07 PROCEDURE — 54161 CIRCUM 28 DAYS OR OLDER: CPT | Performed by: UROLOGY

## 2024-05-07 PROCEDURE — 2500000004 HC RX 250 GENERAL PHARMACY W/ HCPCS (ALT 636 FOR OP/ED): Performed by: STUDENT IN AN ORGANIZED HEALTH CARE EDUCATION/TRAINING PROGRAM

## 2024-05-07 PROCEDURE — 3600000002 HC OR TIME - INITIAL BASE CHARGE - PROCEDURE LEVEL TWO: Performed by: UROLOGY

## 2024-05-07 PROCEDURE — A4217 STERILE WATER/SALINE, 500 ML: HCPCS | Performed by: UROLOGY

## 2024-05-07 PROCEDURE — 88304 TISSUE EXAM BY PATHOLOGIST: CPT | Mod: TC,ELYLAB | Performed by: UROLOGY

## 2024-05-07 PROCEDURE — 2500000005 HC RX 250 GENERAL PHARMACY W/O HCPCS: Performed by: STUDENT IN AN ORGANIZED HEALTH CARE EDUCATION/TRAINING PROGRAM

## 2024-05-07 PROCEDURE — 2500000001 HC RX 250 WO HCPCS SELF ADMINISTERED DRUGS (ALT 637 FOR MEDICARE OP): Performed by: UROLOGY

## 2024-05-07 PROCEDURE — 7100000002 HC RECOVERY ROOM TIME - EACH INCREMENTAL 1 MINUTE: Performed by: UROLOGY

## 2024-05-07 PROCEDURE — 88304 TISSUE EXAM BY PATHOLOGIST: CPT | Performed by: PATHOLOGY

## 2024-05-07 PROCEDURE — 2720000007 HC OR 272 NO HCPCS: Performed by: UROLOGY

## 2024-05-07 PROCEDURE — 7100000010 HC PHASE TWO TIME - EACH INCREMENTAL 1 MINUTE: Performed by: UROLOGY

## 2024-05-07 PROCEDURE — 2500000004 HC RX 250 GENERAL PHARMACY W/ HCPCS (ALT 636 FOR OP/ED): Mod: JZ | Performed by: UROLOGY

## 2024-05-07 PROCEDURE — 3700000002 HC GENERAL ANESTHESIA TIME - EACH INCREMENTAL 1 MINUTE: Performed by: UROLOGY

## 2024-05-07 PROCEDURE — 7100000001 HC RECOVERY ROOM TIME - INITIAL BASE CHARGE: Performed by: UROLOGY

## 2024-05-07 PROCEDURE — 2500000004 HC RX 250 GENERAL PHARMACY W/ HCPCS (ALT 636 FOR OP/ED): Performed by: UROLOGY

## 2024-05-07 RX ORDER — FENTANYL CITRATE 50 UG/ML
25 INJECTION, SOLUTION INTRAMUSCULAR; INTRAVENOUS EVERY 5 MIN PRN
Status: DISCONTINUED | OUTPATIENT
Start: 2024-05-07 | End: 2024-05-07 | Stop reason: HOSPADM

## 2024-05-07 RX ORDER — OXYCODONE HYDROCHLORIDE 5 MG/1
5 TABLET ORAL EVERY 6 HOURS PRN
Qty: 10 TABLET | Refills: 0 | Status: SHIPPED | OUTPATIENT
Start: 2024-05-07 | End: 2024-05-10

## 2024-05-07 RX ORDER — FENTANYL CITRATE 50 UG/ML
INJECTION, SOLUTION INTRAMUSCULAR; INTRAVENOUS AS NEEDED
Status: DISCONTINUED | OUTPATIENT
Start: 2024-05-07 | End: 2024-05-07

## 2024-05-07 RX ORDER — SODIUM CHLORIDE, SODIUM LACTATE, POTASSIUM CHLORIDE, CALCIUM CHLORIDE 600; 310; 30; 20 MG/100ML; MG/100ML; MG/100ML; MG/100ML
100 INJECTION, SOLUTION INTRAVENOUS CONTINUOUS
Status: DISCONTINUED | OUTPATIENT
Start: 2024-05-07 | End: 2024-05-07 | Stop reason: HOSPADM

## 2024-05-07 RX ORDER — BACITRACIN 500 [USP'U]/G
OINTMENT TOPICAL AS NEEDED
Status: DISCONTINUED | OUTPATIENT
Start: 2024-05-07 | End: 2024-05-07 | Stop reason: HOSPADM

## 2024-05-07 RX ORDER — LABETALOL HYDROCHLORIDE 5 MG/ML
5 INJECTION, SOLUTION INTRAVENOUS ONCE AS NEEDED
Status: DISCONTINUED | OUTPATIENT
Start: 2024-05-07 | End: 2024-05-07 | Stop reason: HOSPADM

## 2024-05-07 RX ORDER — ONDANSETRON HYDROCHLORIDE 2 MG/ML
4 INJECTION, SOLUTION INTRAVENOUS ONCE AS NEEDED
Status: DISCONTINUED | OUTPATIENT
Start: 2024-05-07 | End: 2024-05-07 | Stop reason: HOSPADM

## 2024-05-07 RX ORDER — ONDANSETRON HYDROCHLORIDE 2 MG/ML
INJECTION, SOLUTION INTRAVENOUS AS NEEDED
Status: DISCONTINUED | OUTPATIENT
Start: 2024-05-07 | End: 2024-05-07

## 2024-05-07 RX ORDER — SODIUM CHLORIDE 9 MG/ML
100 INJECTION, SOLUTION INTRAVENOUS CONTINUOUS
Status: DISCONTINUED | OUTPATIENT
Start: 2024-05-07 | End: 2024-05-07 | Stop reason: HOSPADM

## 2024-05-07 RX ORDER — ACETAMINOPHEN 325 MG/1
975 TABLET ORAL ONCE
Status: COMPLETED | OUTPATIENT
Start: 2024-05-07 | End: 2024-05-07

## 2024-05-07 RX ORDER — SODIUM CHLORIDE, SODIUM LACTATE, POTASSIUM CHLORIDE, CALCIUM CHLORIDE 600; 310; 30; 20 MG/100ML; MG/100ML; MG/100ML; MG/100ML
INJECTION, SOLUTION INTRAVENOUS CONTINUOUS PRN
Status: DISCONTINUED | OUTPATIENT
Start: 2024-05-07 | End: 2024-05-07

## 2024-05-07 RX ORDER — CEFAZOLIN SODIUM 2 G/100ML
2 INJECTION, SOLUTION INTRAVENOUS ONCE
Status: COMPLETED | OUTPATIENT
Start: 2024-05-07 | End: 2024-05-07

## 2024-05-07 RX ORDER — PROPOFOL 10 MG/ML
INJECTION, EMULSION INTRAVENOUS AS NEEDED
Status: DISCONTINUED | OUTPATIENT
Start: 2024-05-07 | End: 2024-05-07

## 2024-05-07 RX ORDER — DICLOFENAC SODIUM 50 MG/1
50 TABLET, DELAYED RELEASE ORAL 2 TIMES DAILY PRN
Qty: 20 TABLET | Refills: 0 | Status: SHIPPED | OUTPATIENT
Start: 2024-05-07

## 2024-05-07 RX ORDER — LIDOCAINE HYDROCHLORIDE 20 MG/ML
INJECTION, SOLUTION INFILTRATION; PERINEURAL AS NEEDED
Status: DISCONTINUED | OUTPATIENT
Start: 2024-05-07 | End: 2024-05-07

## 2024-05-07 RX ORDER — SODIUM CHLORIDE 0.9 G/100ML
IRRIGANT IRRIGATION AS NEEDED
Status: DISCONTINUED | OUTPATIENT
Start: 2024-05-07 | End: 2024-05-07 | Stop reason: HOSPADM

## 2024-05-07 RX ORDER — OXYCODONE HYDROCHLORIDE 5 MG/1
5 TABLET ORAL EVERY 4 HOURS PRN
Status: CANCELLED | OUTPATIENT
Start: 2024-05-07

## 2024-05-07 RX ORDER — MIDAZOLAM HYDROCHLORIDE 1 MG/ML
INJECTION, SOLUTION INTRAMUSCULAR; INTRAVENOUS AS NEEDED
Status: DISCONTINUED | OUTPATIENT
Start: 2024-05-07 | End: 2024-05-07

## 2024-05-07 RX ADMIN — DEXAMETHASONE SODIUM PHOSPHATE 4 MG: 4 INJECTION, SOLUTION INTRAMUSCULAR; INTRAVENOUS at 07:52

## 2024-05-07 RX ADMIN — FENTANYL CITRATE 50 MCG: 50 INJECTION, SOLUTION INTRAMUSCULAR; INTRAVENOUS at 08:00

## 2024-05-07 RX ADMIN — SODIUM CHLORIDE, SODIUM LACTATE, POTASSIUM CHLORIDE, AND CALCIUM CHLORIDE: 600; 310; 30; 20 INJECTION, SOLUTION INTRAVENOUS at 07:44

## 2024-05-07 RX ADMIN — FENTANYL CITRATE 50 MCG: 50 INJECTION, SOLUTION INTRAMUSCULAR; INTRAVENOUS at 08:13

## 2024-05-07 RX ADMIN — LIDOCAINE HYDROCHLORIDE 80 MG: 20 INJECTION, SOLUTION INFILTRATION; PERINEURAL at 07:48

## 2024-05-07 RX ADMIN — MIDAZOLAM 2 MG: 1 INJECTION INTRAMUSCULAR; INTRAVENOUS at 07:44

## 2024-05-07 RX ADMIN — ONDANSETRON 4 MG: 2 INJECTION, SOLUTION INTRAMUSCULAR; INTRAVENOUS at 08:29

## 2024-05-07 RX ADMIN — ACETAMINOPHEN 975 MG: 325 TABLET ORAL at 06:10

## 2024-05-07 RX ADMIN — SODIUM CHLORIDE 100 ML/HR: 9 INJECTION, SOLUTION INTRAVENOUS at 06:10

## 2024-05-07 RX ADMIN — CEFAZOLIN SODIUM 2 G: 2 INJECTION, SOLUTION INTRAVENOUS at 07:52

## 2024-05-07 RX ADMIN — FENTANYL CITRATE 50 MCG: 50 INJECTION, SOLUTION INTRAMUSCULAR; INTRAVENOUS at 08:48

## 2024-05-07 RX ADMIN — PROPOFOL 200 MG: 10 INJECTION, EMULSION INTRAVENOUS at 07:48

## 2024-05-07 RX ADMIN — FENTANYL CITRATE 50 MCG: 50 INJECTION, SOLUTION INTRAMUSCULAR; INTRAVENOUS at 07:48

## 2024-05-07 SDOH — HEALTH STABILITY: MENTAL HEALTH: CURRENT SMOKER: 0

## 2024-05-07 ASSESSMENT — PAIN - FUNCTIONAL ASSESSMENT
PAIN_FUNCTIONAL_ASSESSMENT: 0-10

## 2024-05-07 ASSESSMENT — PAIN SCALES - GENERAL
PAINLEVEL_OUTOF10: 0 - NO PAIN
PAINLEVEL_OUTOF10: 5 - MODERATE PAIN
PAINLEVEL_OUTOF10: 0 - NO PAIN

## 2024-05-07 ASSESSMENT — COLUMBIA-SUICIDE SEVERITY RATING SCALE - C-SSRS
1. IN THE PAST MONTH, HAVE YOU WISHED YOU WERE DEAD OR WISHED YOU COULD GO TO SLEEP AND NOT WAKE UP?: NO
6. HAVE YOU EVER DONE ANYTHING, STARTED TO DO ANYTHING, OR PREPARED TO DO ANYTHING TO END YOUR LIFE?: NO
2. HAVE YOU ACTUALLY HAD ANY THOUGHTS OF KILLING YOURSELF?: NO

## 2024-05-07 ASSESSMENT — PAIN DESCRIPTION - DESCRIPTORS: DESCRIPTORS: THROBBING

## 2024-05-07 NOTE — ANESTHESIA PROCEDURE NOTES
Airway  Date/Time: 5/7/2024 7:50 AM  Urgency: elective      Staffing  Performed: attending   Authorized by: Mathew Elise MD    Performed by: Mathew Elise MD  Patient location during procedure: OR    Indications and Patient Condition  Indications for airway management: anesthesia  Spontaneous Ventilation: absent  Sedation level: deep  Preoxygenated: yes  Patient position: sniffing  Mask difficulty assessment: 1 - vent by mask    Final Airway Details  Final airway type: supraglottic airway      Successful airway: classic  Size 4     Number of attempts at approach: 1

## 2024-05-07 NOTE — DISCHARGE INSTRUCTIONS
Post-Op Instructions for Circumcision    Procedure Overview  You have had a circumcision. The foreskin was removed to uncover the head of the penis. The skin edges at the site of incision were brought back together with dissolving stitches.    Pain Management:  Tylenol and Ibuprofen are typically sufficient.  Tylenol 1000 mg can be taken every 6 hours. Ibuprofen 600 mg (Motrin or Advil) can also be taken every 6 hours.  You can alternate these medications such that you are taking either Tylenol or ibuprofen every 3 hours.    Occasionally we will prescribe an opioid pain medication postoperatively, ONLY use this for pain not controlled by Tylenol and ibuprofen.    Aleve (naproxen) is an acceptable alternative to ibuprofen.    Wound Care/Surgical Site Care:  Typically rolled gauze will be wrapped around your penis to protect the wound after surgery. The gauze can be removed within 24 hours of surgery. This is done by removing the tape and unraveling the gauze. You do not need to replace this with another bandage or dressing on the penis. Apply petroleum jelly to the incision 3-4x per day for the first 48 hours after surgery.    Bleeding: If bleeding occurs from the skin edges or in between the stitches, apply firm steady pressure over the area for 10-15 minutes or until the bleeding stops, then apply an ice pack (cold gel pack, or crushed ice in a plastic bag, or a small bag of frozen peas). Do not apply ice directly onto the skin. An ice pack should always be wrapped in a pillowcase or towel, before applying.     Bruising and Swelling: You will have some bruising, swelling and tenderness of the penis for a few days. You will see this especially along the penile shaft and it will often progress for 24-48 hours before starting to improve. Discoloration of the penile skin will occur during the healing process and may take a few weeks. You may feel more comfortable wearing snug-fitting underwear for support.      Activity  Do not lift anything over 15 lbs (about a gallon jug of milk) for 2 weeks. Heavy lifting could cause bleeding in your penis/scrotum.    Avoid sexual activity for 3-4 weeks, this includes masturbation    Walking is encouraged. Bed rest is a thing of the past, the more you're up walking, the better. Walking helps prevent blood clots and pneumonia after surgery. You can take stairs, just go slowly at first.     Diet  You may return to eating regular food immediately. To keep your urine flowing freely and to avoid constipation, drink plenty of fluids during the day (8-10 glasses). Water is the best.    Bowel Movements  Constipation is common after surgery. We would like you to avoid this by using miralax at least once a day until you are having 1 soft bowel movement per day. If you are having issues with constipation, increase your dose of miralax to twice a day.     Other Medications  Resume your medications unless we tell you otherwise  Do not resume any blood thinners until your doctor specifies when it is okay.    Driving  You can resume driving after you have been off of opioid pain medications for 24 hours and feel you can press on the breaks suddenly (or other similar movements) if needed.     Problems you should report to your Doctor (contact info below)  Fever greater than 100.5 degrees F  Heavy bleeding or clots  Inability to urinate  Reactions to medication (hives, rash, nausea, vomiting, diarrhea)    Follow-up  We typically arrange your postoperative appointment for you.  If you do not hear from us to schedule this appointment within 1 week after surgery, please call our office.    DR. YEE'S CONTACT INFORMATION  For non-urgent issues, please send our office a message via [employer], this is often easier and more convenient for you than calling the office. You should have received an invitation to sign-up for [employer] in your email upon registration.    For appointments:  [phone],  option 1 -> option 3 -> option 9    For questions/issues/concerns during business hours: [phone] - this number will direct you to the voicemail for Dr. Stock's , which is frequently checked during business hours.     For after-hours issues:  [phone], this will direct you to our answering service or the resident physician on call if needed.    For medical emergencies, please call 911 or proceed to your nearest emergency room.      General Anesthesia Discharge Instructions    About this topic  You may need general anesthesia if you need to be asleep during a procedure. Your doctor will use drugs to block the signals that go from your nerves to your brain. Doctors give general anesthesia during a surgery or procedure to:  Allow you to sleep  Help your body be still  Relax your muscles  Help you to relax and be pain free  Keep you from remembering the surgery  Let the doctor manage your airway, breathing, and blood flow  The doctor or nurse anesthetist gives general anesthesia by a shot into your vein. Sometimes, you may breathe in a gas through a mask placed over your face.  What care is needed at home?  Ask your doctor what you need to do when you go home. Make sure you ask questions if you do not understand what the doctor says.  Your doctor may give you drugs to prevent or treat an upset stomach from the anesthetic. Take them as ordered.  If your throat is sore, suck on ice chips or popsicles to ease throat pain.  Put 2 to 3 pillows under your head and back when you lie down to help you breathe easier.  For the first 24 to 48 hours:  Do not operate heavy or dangerous machinery.  Do not make major decisions or sign important papers. You may not be able to think clearly.  Avoid beer, wine, or mixed drinks.  You are at a higher risk of falling for at least 24 hours after general anesthesia.  Take extra care when you get up.  Do not change positions quickly.  Do not rush when you  need to go to the bathroom or to answer the phone.  Ask for help if you feel unsteady when you try to walk.  Wear shoes with non-slip soles and low heels.  What follow-up care is needed?  Your doctor may ask you to come back to the office to check on your progress. Be sure to keep these visits.  If you have stitches that do not dissolve or staples, you will need to have them removed. Your doctor will want to do this in 1 to 2 weeks. If the doctor used skin glue, the glue will fall off on its own.  What drugs may be needed?  The doctor may order drugs to:  Help with pain  Treat an upset stomach or throwing up  Will physical activity be limited?  You will not be allowed to drive right away after the procedure. Ask a family member or a friend to drive you home.  Avoid trying to get out of bed without help until you are sure of your balance.  You may have to limit your activity. Talk to your doctor about if you need to limit how much you lift or limit exercise after your procedure.  What changes to diet are needed?  Start with a light diet when you are fully awake. This includes things that are easy to swallow like soups, pudding, jello, toast, and eggs. Slowly progress to your normal diet.  What problems could happen?  Low blood pressure  Breathing problems  Upset stomach or throwing up  Dizziness  Blood clots  Infection  When do I need to call the doctor?  Trouble breathing  Upset stomach or throwing up more than 3 times in the next 2 days  Dizziness  Teach Back: Helping You Understand  The Teach Back Method helps you understand the information we are giving you. After you talk with the staff, tell them in your own words what you learned. This helps to make sure the staff has described each thing clearly. It also helps to explain things that may have been confusing. Before going home, make sure you can do these:  I can tell you about my procedure.  I can tell you if I need to follow up with my doctor.  I can tell you  what is good for me to eat and drink the next day.  I can tell you what I would do if I have trouble breathing, an upset stomach, or dizziness.  Where can I learn more?  [employer]  https://www.nigms.nih.gov/education/pages/factsheet_Anesthesia.aspx  NHS Choices  http://www.nhs.uk/conditions/Anaesthetic-general/Pages/Definition.aspx  Last Reviewed Date  2020-04-22

## 2024-05-07 NOTE — ANESTHESIA POSTPROCEDURE EVALUATION
Patient: Victor Hugo Esposito    Procedure Summary       Date: 05/07/24 Room / Location: [facility]  / [facility]    Anesthesia Start: 0744 Anesthesia Stop: 0901    Procedure: Circumcision Diagnosis:       Phimosis      (Phimosis [N47.1])    Surgeons: Augustus Stock MD Responsible Provider: Mathew Elise MD    Anesthesia Type: general ASA Status: 2            Anesthesia Type: general    Vitals Value Taken Time   /80 05/07/24 0904   Temp 36.0 05/07/24 0904   Pulse 81 05/07/24 0904   Resp 9 05/07/24 0904   SpO2 98% 05/07/24 0904       Anesthesia Post Evaluation    Patient location during evaluation: PACU  Patient participation: complete - patient participated  Level of consciousness: awake and alert  Pain management: adequate  Multimodal analgesia pain management approach  Airway patency: patent  Cardiovascular status: acceptable  Respiratory status: acceptable  Hydration status: acceptable  Postoperative Nausea and Vomiting: none        No notable events documented.

## 2024-05-07 NOTE — ANESTHESIA PREPROCEDURE EVALUATION
Victor Hugo Esposito is a 63 y.o. male here for:    Circumcision  With Augustus Stock MD  Pre-Op Diagnosis Codes:     * Phimosis [N47.1]    Relevant Problems   Cardiac   (+) Hyperlipidemia      Pulmonary   (+) GIGI (obstructive sleep apnea)      Endocrine   (+) Class 1 obesity in adult   (+) Hypothyroidism in adult      ID   (+) Recurrent respiratory infection      Circulatory   (+) Borderline hypertension      Genitourinary   (+) Phimosis       Lab Results   Component Value Date    HGB 13.2 (L) 12/20/2023    HCT 40.6 (L) 12/20/2023    WBC 4.9 12/20/2023     12/20/2023     12/20/2023    K 4.4 12/20/2023     12/20/2023    CREATININE 0.95 12/20/2023    BUN 17 12/20/2023       Social History     Tobacco Use   Smoking Status Never   Smokeless Tobacco Never       Allergies   Allergen Reactions    Bee Venom Protein (Honey Bee) Hives    Clonazepam Other and Unknown     Thoughts of suicide    Elevated CNS   Thoughts of suicide     Elevated CNS   Thoughts of suicide       Current Outpatient Medications   Medication Instructions    aspirin 81 mg EC tablet 1 tablet, oral, Nightly    cetirizine (ZYRTEC) 10 mg, oral, Daily    cholecalciferol (VITAMIN D-3) 2,000 Units, oral, Daily, WINTER USE ONLY    docusate sodium (COLACE) 100 mg, oral, Every 12 hours    EPINEPHrine (EPIPEN) 0.3 mg, intramuscular, Once as needed    glucosamine HCl/chondroitin dewey (GLUCOSAMINE-CHONDROITIN ORAL) 1,200 mg, oral, Daily    levothyroxine (SYNTHROID, LEVOXYL) 75 mcg, oral, Daily, as directed    magnesium oxide (MAG-OX) 400 mg, oral, Nightly    multivitamin with minerals (Adults Multivitamin) tablet 1 tablet, oral, Daily    rOPINIRole (REQUIP) 3 mg, oral, Daily    simvastatin (ZOCOR) 40 mg, oral, Daily       Past Surgical History:   Procedure Laterality Date    OTHER SURGICAL HISTORY  02/25/2020    No history of surgery    TONSILLECTOMY      US ASPIRATION INJECTION INTERMEDIATE JOINT  05/01/2020    US ASPIRATION INJECTION INTERMEDIATE JOINT  5/1/2020 [facility]       Family History   Family history unknown: Yes       NPO Details:  NPO/Void Status  Carbohydrate Drink Given Prior to Surgery? : N  Date of Last Liquid: 05/07/24  Time of Last Liquid: 0530 (4 oz of water this morning)  Date of Last Solid: 05/06/24  Time of Last Solid: 2100  Last Intake Type: Clear fluids  Time of Last Void: 0515        Physical Exam    Airway  Mallampati: II  TM distance: >3 FB     Cardiovascular    Dental - normal exam     Pulmonary    Abdominal            Anesthesia Plan    History of general anesthesia?: yes  History of complications of general anesthesia?: no    ASA 2     general     The patient is not a current smoker.    intravenous induction   Postoperative administration of opioids is intended.  Trial extubation is planned.  Anesthetic plan and risks discussed with patient.

## 2024-05-07 NOTE — H&P
History Of Present Illness  Here for circumcision, no recent change in health    Past Medical History  Past Medical History:   Diagnosis Date    Allergy status to unspecified drugs, medicaments and biological substances     History of allergic reaction    Arthritis     Bronchitis     Bronchospasm     COVID-19     VACCINATED    GERD (gastroesophageal reflux disease)     Hyperlipidemia     Hypertension     Hypothyroidism     Nasal congestion 02/25/2020    Nasal congestion with rhinorrhea    Pain in unspecified hand 11/23/2021    Hand pain    Phimosis     RLS (restless legs syndrome)     Sleep apnea     NO CPAP    Strain of muscle, fascia and tendon of the posterior muscle group at thigh level, unspecified thigh, initial encounter 04/21/2020    Hamstring strain    Synovitis and tenosynovitis, unspecified 11/23/2021    Tenosynovitis of hand    Wears glasses         Surgical History  Past Surgical History:   Procedure Laterality Date    OTHER SURGICAL HISTORY  02/25/2020    No history of surgery    TONSILLECTOMY      US ASPIRATION INJECTION INTERMEDIATE JOINT  05/01/2020    US ASPIRATION INJECTION INTERMEDIATE JOINT 5/1/2020 [facility]        Social History  He reports that he has never smoked. He has never used smokeless tobacco. He reports current alcohol use. He reports that he does not use drugs.    Family History  Family History   Family history unknown: Yes        Allergies  Bee venom protein (honey bee) and Clonazepam    ROS: 12 system review was completed and is negative with the exception of those signs and symptoms noted in the history of present illness: A 12 system review was completed and is negative with the exception of those signs and symptoms noted in the history of present illness.     Exam:  General: in NAD, appears stated age  Head: normocephalic, atraumatic  Respiratory: normal effort, no use of accessory muscles  Cardiovascular: no edema noted  Skin: normal turgor, no rashes  Neurologic:  "grossly intact, oriented to person/place/time  Psychiatric: mode and affect appropriate    Per anesthesiology, clear to auscultation bilaterally with a regular rate and rhythm     Last Recorded Vitals  /89   Pulse 74   Temp 36.5 °C (97.7 °F) (Temporal)   Resp 16   Ht 1.626 m (5' 4\")   Wt 86.7 kg (191 lb 2.2 oz)   SpO2 98%   BMI 32.81 kg/m²       No results found for: \"URINECULTURE\", \"CREATININE\"      ASSESSMENT/PLAN:  Okay to proceed with circumcision    Augustus Stock MD    "

## 2024-05-07 NOTE — OP NOTE
Circumcision Operative Note     Date: 2024  OR Location: [facility]    Name: Victor Hugo Esposito, : 1960, Age: 63 y.o., MRN: [id number], Sex: male    Diagnosis  Pre-op Diagnosis     * Phimosis [N47.1] Post-op Diagnosis     * Phimosis [N47.1]     Procedures  Circumcision  [address] - [place] CIRCUMCISION AGE >28 DAYS      Surgeons      * Augustus Stock - Primary    Resident/Fellow/Other Assistant:  Surgeons and Role:     * Jeni June PA-C - Assisting    Procedure Summary  Anesthesia: General  ASA: II  Anesthesia Staff: Anesthesiologist: Mathew Elise MD  Estimated Blood Loss: 10mL  Intra-op Medications:   Administrations occurring from 0730 to 0830 on 24:   Medication Name Total Dose   sodium chloride 0.9 % irrigation solution 1,000 mL   BUPivacaine HCl (Marcaine) 0.5 % (5 mg/mL) 20 mL, lidocaine (Xylocaine) 20 mL syringe Cannot be calculated   ceFAZolin in dextrose (iso-os) (Ancef) IVPB 2 g 2 g              Anesthesia Record               Intraprocedure I/O Totals          Intake    LR infusion 500.00 mL    Total Intake 500 mL          Specimen:   ID Type Source Tests Collected by Time   1 : FORESKIN Tissue FORESKIN, OTHER THAN  SURGICAL PATHOLOGY EXAM Augustus Stock MD 2024 0812        Staff:   Circulator: Claudine Norris RN  Scrub Person: Anna Marie Orellana    Operative Indications: 63-year-old male with a very dense phimosis with a narrow cicatrix preventing reduction of foreskin.  This has been leading to pain, pain with erections, difficulty with intercourse, difficulty with hygiene.  In light of this, we agreed to proceed with circumcision. The patient was counseled regarding the risks, benefits, alternatives, equipment, and personnel involved and consented to proceed with surgical intervention.    Operative Findings: Very narrow phimosis, uncomplicated circumcision.  Cicatrix excised.    Operative Procedure:   The patient was correctly identified and the operative plan was confirmed with the  patient and the operative team. A weight appropriate dose of prophylactic antibiotics was administered intravenously prior to the procedure and sequential compression devices were applied to the lower extremities and activated prior to induction of anesthesia. The patient was placed in supine position. Anesthesia was induced. The patient was repositioned in supine, arms-out position. All pressure points were padded per protocol and the operative area was prepped and draped in the usual sterile fashion.    A penile block was performed by instilling 5 cc of local anesthesia, 0.5% Marcaine, 2% lidocaine, at the penopubic junction at 10 and 2.  Additionally, a ring block was performed by instilling 10 cc around the base of the penis circumferentially.    The cicatrix prevented reduction of the foreskin, therefore I placed a clamp across it for roughly 2 minutes.  I then sharply incised across the cicatrix until I could reduce the foreskin.  The gland was then reprepped with Betadine solution.  Glans stitch was placed using a 2-0 silk suture.  The proximal skin collar and distal mucosal collar were marked out circumferentially using a marking pen.  I used care to make sure to include the cicatrix within the excised foreskin.  The mucosal collars were incised circumferentially using a scalpel.  Dartos fascia was divided down to Thurman's fascia and the proximal aspect of the mucosal collar.  Distally, and divided Garceau's fascia until Thurman's fascia was encountered.  A hemostat was then passed from the distal mucosal collar to the proximal skin collar and the intervening skin was divided.  Snaps were placed on the 4 corners of the divided skin.  Then  excised foreskin as well as underlying dartos tissue were excised using electrocautery.  Hemostasis was then obtained using judicious electrocautery.  Next, dartos tissue was reapproximated using interrupted 4-0 Vicryl suture in 4 quadrants.  Skin was then reapproximated using  interrupted 4-0 chromic suture.  At the conclusion, there was a good cosmetic result.  Patient was cleaned, surgical glue was applied.  Once the surgical glue dried, a Yue wrap was applied followed by a Coban wrap leaving the meatus open for voiding    Counts were correct. Sign-out was performed with the surgical team confirming the specimen listed below. The patient tolerated the procedure well, emerged from anesthesia without incident, and was transferred to PACU in stable condition.     I (Augustus Stock MD) performed the procedure with assistance from a PA assist    Augustus Stock  Phone Number: [phone]

## 2024-05-13 LAB
LABORATORY COMMENT REPORT: NORMAL
PATH REPORT.FINAL DX SPEC: NORMAL
PATH REPORT.GROSS SPEC: NORMAL
PATH REPORT.RELEVANT HX SPEC: NORMAL
PATH REPORT.TOTAL CANCER: NORMAL

## 2024-05-16 ENCOUNTER — TELEPHONE (OUTPATIENT)
Dept: UROLOGY | Facility: CLINIC | Age: 64
End: 2024-05-16
Payer: COMMERCIAL

## 2024-05-16 NOTE — TELEPHONE ENCOUNTER
"Patient had circumcision and reports that now that he he has what looks like a \"pimple\" on tip off penis and also on the other side looks like there is a \"sore\", has some redness and tenderness. The stitches are intact.   "

## 2024-05-17 ENCOUNTER — OFFICE VISIT (OUTPATIENT)
Dept: UROLOGY | Facility: CLINIC | Age: 64
End: 2024-05-17
Payer: COMMERCIAL

## 2024-05-17 VITALS — TEMPERATURE: 96.8 F | DIASTOLIC BLOOD PRESSURE: 100 MMHG | HEART RATE: 79 BPM | SYSTOLIC BLOOD PRESSURE: 182 MMHG

## 2024-05-17 DIAGNOSIS — N47.1 PHIMOSIS: Primary | ICD-10-CM

## 2024-05-17 PROCEDURE — 99024 POSTOP FOLLOW-UP VISIT: CPT | Performed by: UROLOGY

## 2024-05-17 PROCEDURE — 1036F TOBACCO NON-USER: CPT | Performed by: UROLOGY

## 2024-05-17 NOTE — PROGRESS NOTES
PRIOR NOTES  63-year-old male with severe phimosis secondary to lichen sclerosis who recently underwent circumcision, presents for postop exam for concerns with weeping drainage on the head of his penis and tenderness.    UPDATED SUBJECTIVE HISTORY  05/17/24 -patient reports incision line has been healing well, however on the head of his penis he has tenderness in several areas that appear white like a pimple.  No fevers or chills    Past Medical History  He has a past medical history of Allergy status to unspecified drugs, medicaments and biological substances, Arthritis, Bronchitis, Bronchospasm, COVID-19, GERD (gastroesophageal reflux disease), Hyperlipidemia, Hypertension, Hypothyroidism, Nasal congestion (02/25/2020), Pain in unspecified hand (11/23/2021), Phimosis, RLS (restless legs syndrome), Sleep apnea, Strain of muscle, fascia and tendon of the posterior muscle group at thigh level, unspecified thigh, initial encounter (04/21/2020), Synovitis and tenosynovitis, unspecified (11/23/2021), and Wears glasses.    Surgical History  He has a past surgical history that includes Other surgical history (02/25/2020); US aspiration injection intermediate joint (05/01/2020); and Tonsillectomy.     Social History  He reports that he has never smoked. He has never used smokeless tobacco. He reports current alcohol use. He reports that he does not use drugs.    Family History  Family History   Family history unknown: Yes        Allergies  Bee venom protein (honey bee) and Clonazepam    ROS: 12 system review was completed and is negative with the exception of those signs and symptoms noted in the history of present illness: A 12 system review was completed and is negative with the exception of those signs and symptoms noted in the history of present illness.     Exam:  General: in NAD, appears stated age  Head: normocephalic, atraumatic  Respiratory: normal effort, no use of accessory muscles  Cardiovascular: no edema  noted  Skin: normal turgor, no rashes  Neurologic: grossly intact, oriented to person/place/time  Psychiatric: mode and affect appropriate  Circumcision line appears well-healed, the head of his penis is very edematous with 2 whitish lesions that to me look like skin breakdown from his severe lymphedema     Last Recorded Vitals  Blood pressure (!) 182/100, pulse 79, temperature 36 °C (96.8 °F), temperature source Temporal.    Lab Results   Component Value Date    PSASCREEN 0.60 12/07/2021    CREATININE 0.95 12/20/2023    HGB 13.2 (L) 12/20/2023         ASSESSMENT/PLAN:  # Postoperative edema, skin breakdown on the head of the penis  -Aquaphor/petroleum jelly ointment 2-3 times daily while continues to heal up  -Repeat exam in roughly 1 month    Augustus Stock MD

## 2024-06-13 ENCOUNTER — APPOINTMENT (OUTPATIENT)
Dept: UROLOGY | Facility: CLINIC | Age: 64
End: 2024-06-13
Payer: COMMERCIAL

## 2024-08-29 ENCOUNTER — TELEPHONE (OUTPATIENT)
Dept: PRIMARY CARE | Facility: CLINIC | Age: 64
End: 2024-08-29
Payer: COMMERCIAL

## 2024-08-29 DIAGNOSIS — B00.9 HERPES SIMPLEX DISEASE: Primary | ICD-10-CM

## 2024-08-29 RX ORDER — ACYCLOVIR 50 MG/G
1 OINTMENT TOPICAL
Qty: 5 G | Refills: 1 | Status: SHIPPED | OUTPATIENT
Start: 2024-08-29 | End: 2024-09-02

## 2024-08-29 RX ORDER — ACYCLOVIR 400 MG/1
400 TABLET ORAL 3 TIMES DAILY
Qty: 15 TABLET | Refills: 0 | Status: SHIPPED | OUTPATIENT
Start: 2024-08-29 | End: 2024-09-03

## 2024-08-29 NOTE — TELEPHONE ENCOUNTER
The patient is requesting a prescription for two medications to treat viral herpes.    The first medication is ACYCLOVIR 400mg tablets taken orally.  The other he states is an ointment that he was prescribed by Dr. Anderson to rub on the lip.    The patient is scheduled for a follow up appointment to discuss medication on 9/9/2024.    If approved, may it be sent for fill [facility] on [facility].    Please advise.  Thank you.

## 2024-09-04 ENCOUNTER — TELEPHONE (OUTPATIENT)
Dept: PRIMARY CARE | Facility: CLINIC | Age: 64
End: 2024-09-04
Payer: COMMERCIAL

## 2024-09-04 NOTE — TELEPHONE ENCOUNTER
We received a request for prior authorization on the patient's acyclovir (Zovirax) 5 % ointment   from their pharmacy. Prior authorization was submitted to insurance today. We will await their determination.

## 2024-09-04 NOTE — TELEPHONE ENCOUNTER
Prior Authorization  has been APPROVED.     CaseId:[id number]  Status:Approved  Review Type:Prior Auth  Coverage Start Date:08/05/2024  Coverage End Date:09/04/2025   Complex Repair And Single Advancement Flap Text: The defect edges were debeveled with a #15 scalpel blade.  The primary defect was closed partially with a complex linear closure.  Given the location of the remaining defect, shape of the defect and the proximity to free margins a single advancement flap was deemed most appropriate for complete closure of the defect.  Using a sterile surgical marker, an appropriate advancement flap was drawn incorporating the defect and placing the expected incisions within the relaxed skin tension lines where possible.    The area thus outlined was incised deep to adipose tissue with a #15 scalpel blade.  The skin margins were undermined to an appropriate distance in all directions utilizing iris scissors.

## 2024-09-05 ENCOUNTER — TELEPHONE (OUTPATIENT)
Dept: PRIMARY CARE | Facility: CLINIC | Age: 64
End: 2024-09-05
Payer: COMMERCIAL

## 2024-09-05 NOTE — TELEPHONE ENCOUNTER
The patient phoned in to request a prescription be sent to the pharmacy to treat the following symptoms.   The patient states the symptom's started on Tuesday:  -loss of taste/ smell  -hot/ cold chills (has not tested body temp.)  -diarrhea   -vomiting  The patient could not eat anything on Tuesday morning. He tried to eat some soup on Wednesday.  He believe he may have covid, but the at home test read negative.  He is scheduled to come in to the office on Monday.  I advised him that if he is still feeling ill we will have him mask up.    He would like for any medication(s) to be sent to [facility].

## 2024-09-09 ENCOUNTER — LAB (OUTPATIENT)
Dept: LAB | Facility: LAB | Age: 64
End: 2024-09-09
Payer: COMMERCIAL

## 2024-09-09 ENCOUNTER — APPOINTMENT (OUTPATIENT)
Dept: PRIMARY CARE | Facility: CLINIC | Age: 64
End: 2024-09-09
Payer: MEDICARE

## 2024-09-09 VITALS
BODY MASS INDEX: 32.78 KG/M2 | DIASTOLIC BLOOD PRESSURE: 84 MMHG | RESPIRATION RATE: 16 BRPM | WEIGHT: 192 LBS | SYSTOLIC BLOOD PRESSURE: 136 MMHG | TEMPERATURE: 97.8 F | HEIGHT: 64 IN | OXYGEN SATURATION: 96 % | HEART RATE: 93 BPM

## 2024-09-09 DIAGNOSIS — E55.9 HYPOVITAMINOSIS D: ICD-10-CM

## 2024-09-09 DIAGNOSIS — K64.4 BLEEDING EXTERNAL HEMORRHOIDS: ICD-10-CM

## 2024-09-09 DIAGNOSIS — Z87.09 HISTORY OF FREQUENT UPPER RESPIRATORY INFECTION: Primary | ICD-10-CM

## 2024-09-09 DIAGNOSIS — T73.2XXA FATIGUE DUE TO EXPOSURE, INITIAL ENCOUNTER: ICD-10-CM

## 2024-09-09 DIAGNOSIS — Z87.09 HISTORY OF FREQUENT UPPER RESPIRATORY INFECTION: ICD-10-CM

## 2024-09-09 LAB
25(OH)D3 SERPL-MCNC: 40 NG/ML (ref 30–100)
BASOPHILS # BLD AUTO: 0.02 X10*3/UL (ref 0–0.1)
BASOPHILS NFR BLD AUTO: 0.4 %
EOSINOPHIL # BLD AUTO: 0.25 X10*3/UL (ref 0–0.7)
EOSINOPHIL NFR BLD AUTO: 4.9 %
ERYTHROCYTE [DISTWIDTH] IN BLOOD BY AUTOMATED COUNT: 13.2 % (ref 11.5–14.5)
ERYTHROCYTE [SEDIMENTATION RATE] IN BLOOD BY WESTERGREN METHOD: 6 MM/H (ref 0–20)
HCT VFR BLD AUTO: 41.6 % (ref 41–52)
HGB BLD-MCNC: 13.4 G/DL (ref 13.5–17.5)
IMM GRANULOCYTES # BLD AUTO: 0.06 X10*3/UL (ref 0–0.7)
IMM GRANULOCYTES NFR BLD AUTO: 1.2 % (ref 0–0.9)
LYMPHOCYTES # BLD AUTO: 1.47 X10*3/UL (ref 1.2–4.8)
LYMPHOCYTES NFR BLD AUTO: 28.5 %
MCH RBC QN AUTO: 30.2 PG (ref 26–34)
MCHC RBC AUTO-ENTMCNC: 32.2 G/DL (ref 32–36)
MCV RBC AUTO: 94 FL (ref 80–100)
MONOCYTES # BLD AUTO: 0.27 X10*3/UL (ref 0.1–1)
MONOCYTES NFR BLD AUTO: 5.2 %
NEUTROPHILS # BLD AUTO: 3.08 X10*3/UL (ref 1.2–7.7)
NEUTROPHILS NFR BLD AUTO: 59.8 %
NRBC BLD-RTO: 0 /100 WBCS (ref 0–0)
PLATELET # BLD AUTO: 278 X10*3/UL (ref 150–450)
RBC # BLD AUTO: 4.44 X10*6/UL (ref 4.5–5.9)
WBC # BLD AUTO: 5.2 X10*3/UL (ref 4.4–11.3)

## 2024-09-09 PROCEDURE — 36415 COLL VENOUS BLD VENIPUNCTURE: CPT

## 2024-09-09 PROCEDURE — 1036F TOBACCO NON-USER: CPT | Performed by: FAMILY MEDICINE

## 2024-09-09 PROCEDURE — 1160F RVW MEDS BY RX/DR IN RCRD: CPT | Performed by: FAMILY MEDICINE

## 2024-09-09 PROCEDURE — 1159F MED LIST DOCD IN RCRD: CPT | Performed by: FAMILY MEDICINE

## 2024-09-09 PROCEDURE — 3008F BODY MASS INDEX DOCD: CPT | Performed by: FAMILY MEDICINE

## 2024-09-09 PROCEDURE — 99213 OFFICE O/P EST LOW 20 MIN: CPT | Performed by: FAMILY MEDICINE

## 2024-09-09 RX ORDER — TRIAMCINOLONE ACETONIDE 1 MG/G
OINTMENT TOPICAL 2 TIMES DAILY PRN
Qty: 15 G | Refills: 0 | Status: SHIPPED | OUTPATIENT
Start: 2024-09-09 | End: 2025-01-07

## 2024-09-09 NOTE — PROGRESS NOTES
"Subjective   Patient ID: Victor Hugo Esposito is a 64 y.o. male who presents for Hemorrhoids and Blood work to check immune system.  HPI  Is a pleasant 64-year-old gentleman is here for 2 agendas 1 is to request to check his immune system he did have roughly 1 year ago COVID has had intermittent infections.  He does agree to getting baseline quantitative immunoglobulins sed rate vitamin D and also CBC.  The patient's main complaint is increasing pain in his external hemorrhoid no significant constipation no bleeding on the toilet tissue but more rectal pain would like to have treatment and/or referral for excision Cha been stable no recent chest pain high fever shaking chills no significant change in bowel habits.  No GI red flags no weight loss no anorexia abdominal pain or vomiting  Review of Systems   Constitutional:  Negative for fatigue, fever and unexpected weight change.   HENT:  Positive for rhinorrhea. Negative for sinus pressure and sinus pain.    Eyes:  Negative for visual disturbance.   Respiratory:  Negative for cough, chest tightness, shortness of breath and wheezing.    Cardiovascular:  Negative for chest pain, palpitations and leg swelling.   Gastrointestinal:  Positive for rectal pain. Negative for abdominal distention, abdominal pain, anal bleeding, blood in stool and vomiting.   Genitourinary:  Negative for dysuria, frequency and hematuria.   Musculoskeletal:  Negative for arthralgias and myalgias.   Skin:  Negative for rash.   Neurological:  Negative for weakness, light-headedness, numbness and headaches.   Hematological:  Negative for adenopathy.   Psychiatric/Behavioral:  Negative for behavioral problems, confusion, sleep disturbance and suicidal ideas.        Objective   /84 (BP Location: Right arm, Patient Position: Sitting, BP Cuff Size: Large adult)   Pulse 93   Temp 36.6 °C (97.8 °F) (Temporal)   Resp 16   Ht 1.626 m (5' 4\")   Wt 87.1 kg (192 lb)   SpO2 96%   BMI 32.96 kg/m² "           Physical Exam  Vital signs reviewed and normal  Anteriorly minimal right watery rhinorrhea but otherwise no thick exudate oral exam benign  Neck neck is all without mass adenopathy bruits rigidity thyroid is normal  Chest chest is clear anteriorly and posteriorly  Cardiovascular RSR without murmurs gallop or ectopy  Abdominal no organomegaly mass rebound rectal examination shows a deflated external hemorrhoidal tag at 12 o'clock position otherwise digital rectal exam is negative no perianal lesions ulcerations or masses or abscesses seen  Peripheral vascular no symmetric or asymmetric edema  Skin no new rashes petechiae or jaundice      Assessment/Plan   Problem List Items Addressed This Visit    None  The patient is requesting check sed rate CBC quantitative immunoglobulins and vitamin D No. 2 will observe warm sitz bath's in the evening with topical Kenalog ointment at the 12 o'clock position use was reviewed if ineffective improvement or clinical worsening then consider surgical referral but he prefers as well as I to observe after adequate treatment for 2 to 4 weeks will recheck in 4 weeks prior history of allergies contributing to his chronic rhinitis at this time sitter ENT referral if recurrence upper respiratory-ENT infections.  Agrees above diagnosis treatment plan important surveillance and keep bowels regular and avoid constipation increasing liquids) advised  @discharge  The above diagnosis and treatment plan was discussed with the patient patient will continue appropriate diet and exercise as reviewed  Patient will recheck earlier if any interval problems of significance or clinical worsening of the above problems.  Agrees above surveillance.  All question were addressed regarding above meds

## 2024-09-10 LAB
IGA SERPL-MCNC: 192 MG/DL (ref 70–400)
IGG SERPL-MCNC: 978 MG/DL (ref 700–1600)
IGM SERPL-MCNC: 69 MG/DL (ref 40–230)

## 2024-09-12 ASSESSMENT — ENCOUNTER SYMPTOMS
SLEEP DISTURBANCE: 0
FATIGUE: 0
HEMATURIA: 0
LIGHT-HEADEDNESS: 0
CONFUSION: 0
ABDOMINAL PAIN: 0
SINUS PAIN: 0
NUMBNESS: 0
ABDOMINAL DISTENTION: 0
UNEXPECTED WEIGHT CHANGE: 0
PALPITATIONS: 0
SINUS PRESSURE: 0
RECTAL PAIN: 1
FEVER: 0
WHEEZING: 0
FREQUENCY: 0
CHEST TIGHTNESS: 0
MYALGIAS: 0
ARTHRALGIAS: 0
WEAKNESS: 0
HEADACHES: 0
ADENOPATHY: 0
BLOOD IN STOOL: 0
RHINORRHEA: 1
ANAL BLEEDING: 0
SHORTNESS OF BREATH: 0
DYSURIA: 0
COUGH: 0
VOMITING: 0

## 2024-09-26 DIAGNOSIS — E78.2 MIXED HYPERLIPIDEMIA: ICD-10-CM

## 2024-09-26 DIAGNOSIS — G25.81 RLS (RESTLESS LEGS SYNDROME): ICD-10-CM

## 2024-09-26 DIAGNOSIS — E03.9 HYPOTHYROIDISM IN ADULT: ICD-10-CM

## 2024-09-26 RX ORDER — ROPINIROLE 3 MG/1
3 TABLET, FILM COATED ORAL NIGHTLY
Qty: 90 TABLET | Refills: 3 | Status: SHIPPED | OUTPATIENT
Start: 2024-09-26

## 2024-09-26 RX ORDER — LEVOTHYROXINE SODIUM 75 UG/1
75 TABLET ORAL DAILY
Qty: 90 TABLET | Refills: 3 | Status: SHIPPED | OUTPATIENT
Start: 2024-09-26

## 2024-09-26 RX ORDER — SIMVASTATIN 40 MG/1
40 TABLET, FILM COATED ORAL DAILY
Qty: 90 TABLET | Refills: 3 | Status: SHIPPED | OUTPATIENT
Start: 2024-09-26

## 2024-09-26 NOTE — TELEPHONE ENCOUNTER
Rx Refill Request     Name: Victor Hugo Esposito  :  1960   Medication Name:    rOPINIRole (Requip) 3 mg tablet - Take 1 tablet (3 mg) by mouth once daily.  simvastatin (Zocor) 40 mg tablet - Take 1 tablet daily.  levothyroxine (Synthroid, Levoxyl) 75 mcg tablet - Take 1 tablet (75 mcg) by mouth once daily.    Specific Pharmacy location:  [facility]  Date of last appointment:  2024   Date of next appointment:  10/1/2024   Best number to reach patient:  [phone]

## 2024-09-30 ENCOUNTER — APPOINTMENT (OUTPATIENT)
Dept: PRIMARY CARE | Facility: CLINIC | Age: 64
End: 2024-09-30
Payer: MEDICARE

## 2024-10-01 ENCOUNTER — APPOINTMENT (OUTPATIENT)
Dept: PRIMARY CARE | Facility: CLINIC | Age: 64
End: 2024-10-01
Payer: MEDICARE

## 2024-10-01 VITALS
BODY MASS INDEX: 32.61 KG/M2 | RESPIRATION RATE: 16 BRPM | HEIGHT: 64 IN | TEMPERATURE: 96.4 F | DIASTOLIC BLOOD PRESSURE: 76 MMHG | SYSTOLIC BLOOD PRESSURE: 122 MMHG | OXYGEN SATURATION: 96 % | HEART RATE: 76 BPM | WEIGHT: 191 LBS

## 2024-10-01 DIAGNOSIS — B00.9 HERPES SIMPLEX DISEASE: ICD-10-CM

## 2024-10-01 DIAGNOSIS — K64.4 EXTERNAL HEMORRHOID, BLEEDING: Primary | ICD-10-CM

## 2024-10-01 PROCEDURE — 1159F MED LIST DOCD IN RCRD: CPT | Performed by: FAMILY MEDICINE

## 2024-10-01 PROCEDURE — 1036F TOBACCO NON-USER: CPT | Performed by: FAMILY MEDICINE

## 2024-10-01 PROCEDURE — 99213 OFFICE O/P EST LOW 20 MIN: CPT | Performed by: FAMILY MEDICINE

## 2024-10-01 PROCEDURE — 1123F ACP DISCUSS/DSCN MKR DOCD: CPT | Performed by: FAMILY MEDICINE

## 2024-10-01 PROCEDURE — 3008F BODY MASS INDEX DOCD: CPT | Performed by: FAMILY MEDICINE

## 2024-10-01 PROCEDURE — 1160F RVW MEDS BY RX/DR IN RCRD: CPT | Performed by: FAMILY MEDICINE

## 2024-10-01 RX ORDER — ACYCLOVIR 400 MG/1
400 TABLET ORAL 3 TIMES DAILY
Qty: 30 TABLET | Refills: 0 | Status: SHIPPED | OUTPATIENT
Start: 2024-10-01 | End: 2024-10-11

## 2024-10-01 RX ORDER — ACYCLOVIR 50 MG/G
1 OINTMENT TOPICAL
Qty: 5 G | Refills: 1 | Status: SHIPPED | OUTPATIENT
Start: 2024-10-01 | End: 2024-10-05

## 2024-10-01 NOTE — PROGRESS NOTES
"Subjective   Patient ID: Victor Hugo Esposito is a 64 y.o. male who presents for Fatigue (3 week follow up ).  HPI  64-year-old Toni is here to review check his fatigue.  His main complaint today is continued rectal pain despite using cortisone cream on the superior 12:00 external hemorrhoid has a fair amount of pain like to have surgical opinion.  The patient is trying observe anticonstipation maneuvers but denies any abdominal bloating abdominal pain nausea vomiting diarrhea melena hematochezia.  Because of his ongoing fatigue from his respiratory infection last December wanted immunology lab drawn we did do quantitative immunoglobulins which were negative his sed rate was normal his CBC showed improved hemoglobin with normal lymphocytes normal PMNs and normal white count.  He is on Synthroid 75 mcg daily and his recent TSH was normal last winter his chemistry was also normal.  Did repeat his vitamin D level recently and that was also normal.  Review of Systems    Objective   /76 (BP Location: Right arm, Patient Position: Sitting, BP Cuff Size: Large adult)   Pulse 76   Temp 35.8 °C (96.4 °F) (Temporal)   Resp 16   Ht 1.626 m (5' 4\")   Wt 86.6 kg (191 lb)   SpO2 96%   BMI 32.79 kg/m²   Immunoglobulins, IgG, IgA, IgM  Order: [id number]   Status: Final result       Visible to patient: No (inaccessible in [facility])       Dx: History of frequent upper respiratory...    2 Result Notes       Component  Ref Range & Units 3 wk ago 2 yr ago   IgG  700 - 1,600 mg/dL 978 1,070 CM   IgA  70 - 400 mg/dL 192 218 CM   IgM  40 - 230 mg/dL 69 87 CM   Resulting Agency [facility]               Contains abnormal data CBC and Auto Differential  Order: [id number]   Status: Final result       Visible to patient: No (inaccessible in [facility])       Dx: History of frequent upper respiratory...    2 Result Notes        Component  Ref Range & Units 3 wk ago 9 mo ago 2 yr ago   WBC  4.4 - 11.3 x10*3/uL 5.2 4.9 4.4 R   nRBC  0.0 - 0.0 " /100 WBCs 0.0 0.0    RBC  4.50 - 5.90 x10*6/uL 4.44 Low  4.44 Low  4.42 Low  R   Hemoglobin  13.5 - 17.5 g/dL 13.4 Low  13.2 Low  13.0 Low    Hematocrit  41.0 - 52.0 % 41.6 40.6 Low  41.0   MCV  80 - 100 fL 94 91 93   MCH  26.0 - 34.0 pg 30.2 29.7    MCHC  32.0 - 36.0 g/dL 32.2 32.5 31.7 Low    RDW  11.5 - 14.5 % 13.2 13.0 13.2   Platelets  150 - 450 x10*3/uL 278 318 301 R   Neutrophils %  40.0 - 80.0 % 59.8 53.1 47.4   Immature Granulocytes %, Automated  0.0 - 0.9 % 1.2 High  1.8 High  CM 0.9 CM   Comment: Immature Granulocyte Count (IG) includes promyelocytes, myelocytes and metamyelocytes but does not include bands. Percent differential counts (%) should be interpreted in the context of the absolute cell counts (cells/UL).   Lymphocytes %  13.0 - 44.0 % 28.5 31.4 38.8   Monocytes %  2.0 - 10.0 % 5.2 10.4 9.3   Eosinophils %  0.0 - 6.0 % 4.9 2.9 3.4   Basophils %  0.0 - 2.0 % 0.4 0.4 0.2   Neutrophils Absolute  1.20 - 7.70 x10*3/uL 3.08 2.61 CM 2.10 R   Comment: Percent differential counts (%) should be interpreted in the context of the absolute cell counts (cells/uL).   Immature Granulocytes Absolute, Automated  0.00 - 0.70 x10*3/uL 0.06 0.09    Lymphocytes Absolute  1.20 - 4.80 x10*3/uL 1.47 1.54 1.72 R   Monocytes Absolute  0.10 - 1.00 x10*3/uL 0.27 0.51 0.41 R   Eosinophils Absolute  0.00 - 0.70 x10*3/uL 0.25 0.14 0.15 R   Basophils Absolute  0.00 - 0.10 x10*3/uL 0.0        Vitamin D 25-Hydroxy,Total (for eval of Vitamin D levels)  Order: [id number]   Status: Final result       Visible to patient: No (inaccessible in  MyChart)       Dx: Hypovitaminosis D; History of frequen...    2 Result Notes      Component  Ref Range & Units 3 wk ago   Vitamin D, 25-Hydroxy, Total  30 - 100 ng/mL 40   Resulting Agency [facility]              Narrative  Performed by: [facility]  Deficiency:         < 20   ng/ml  Insufficiency      Sedimentation Rate  Order: [id number]   Status: Final result       Visible to patient: No (inaccessible in   [facility])       Dx: History of frequent upper respiratory...    2 Result Notes      Component  Ref Range & Units 3 wk ago   Sedimentation Rate  0 - 20 mm/h 6   Resulting Agency [facility]              Comprehensive metabolic panel  Order: [id number]   Status: Final result       Visible to patient: No (inaccessible in [facility])       Dx: Mixed hyperlipidemia; Borderline hype...    2 Result Notes       Component  Ref Range & Units 9 mo ago 2 yr ago   Glucose  74 - 99 mg/dL 96 99   Sodium  136 - 145 mmol/L 140 139   Potassium  3.5 - 5.3 mmol/L 4.4 5.1   Chloride  98 - 107 mmol/L 106 103   Bicarbonate  21 - 32 mmol/L 27 30   Anion Gap  10 - 20 mmol/L 11 11   Urea Nitrogen  6 - 23 mg/dL 17 15   Creatinine  0.50 - 1.30 mg/dL 0.95 0.86   eGFR  >60 mL/min/1.73m*2 89    Comment: Calculations of estimated GFR are performed using the 2021 CKD-EPI Study Refit equation without the race variable for the IDMS-Traceable creatinine methods.  https://jasn.asnjournals.org/content/early/2021/09/22/ASN.5225758138   Calcium  8.6 - 10.3 mg/dL 8.7 9.3   Albumin  3.4 - 5.0 g/dL 4.2 4.3   Alkaline Phosphatase  33 - 136 U/L 71 70   Total Protein  6.4 - 8.2 g/dL 6.7 7.2   AST  9 - 39 U/L 26 31   Bilirubin, Total  0.0 - 1.2 mg/dL 0.3 0.3   ALT  10 - 52 U/L 46 45 CM   Comment: Patients carlota      TSH  Order: [id number]   Status: Final result       Visible to patient: No (inaccessible in [facility])       Dx: Hypothyroidism in adult    2 Result Notes       1  Topic        Component  Ref Range & Units 9 mo ago  (12/20/23) 2 yr ago  (3/23/22) 2 yr ago  (12/7/21)   Thyroid Stimulating Hormone  0.44 - 3.98 mIU/L 3.52 7.00 High  CM 6.94 High  CM   Resulting Agency                     Physical Exam  Signs reviewed and are normal.  General  a well-developed well-nourished individual in no acute distress.  Neck neck is all without masses adenopathy bruits or rigidity thyroid is normal no JVD  Chest chest is clear anteriorly and posteriorly  Cardiovascular RSR  without murmurs or ectopy  Abdominal no organomegaly mass or rebound  Rectal a hemorrhoidal tag at 12 o'clock position is noted stool was brown Hemoccult negative no rectal or perianal masses noted.  Peripheral vascular no symmetric or asymmetric edema  Skin no rash petechiae or jaundice -patient relates healing earlier phimosis and circumcision care      Assessment/Plan   Problem List Items Addressed This Visit    None  Visit Diagnoses       Herpes simplex disease            Recurrent herpes simplex I did order his to his bolus therapy for 5 days secondly because of ongoing pain despite perianal cortisone cream will refer to general surgery to see if considerations for excisional removal.  Follow-up with his urologist status post circumcision procedure  Did review his normal CBC sed rate TSH and quantitative immunoglobulins and C-reactive protein continue frequent small meals continue multivitamin with iron with history of normocytic anemia  As above Synthroid and also continue his Zocor for significant hypercholesterolemia.  Hopefully with his significant hypercholesterolemia we do not need to remove his statin therapy will be checking him in about 6 to 8 weeks to review progress and also to arrange for free holiday repeat LDL agrees above surveillance diagnosis and treatment plan and referral

## 2024-10-09 ENCOUNTER — APPOINTMENT (OUTPATIENT)
Dept: SURGERY | Facility: CLINIC | Age: 64
End: 2024-10-09
Payer: MEDICARE

## 2024-10-09 VITALS — BODY MASS INDEX: 33.46 KG/M2 | WEIGHT: 196 LBS | HEIGHT: 64 IN

## 2024-10-09 DIAGNOSIS — K64.4 EXTERNAL HEMORRHOID, BLEEDING: ICD-10-CM

## 2024-10-09 PROCEDURE — 1123F ACP DISCUSS/DSCN MKR DOCD: CPT | Performed by: SURGERY

## 2024-10-09 PROCEDURE — 1159F MED LIST DOCD IN RCRD: CPT | Performed by: SURGERY

## 2024-10-09 PROCEDURE — 3008F BODY MASS INDEX DOCD: CPT | Performed by: SURGERY

## 2024-10-09 PROCEDURE — 99203 OFFICE O/P NEW LOW 30 MIN: CPT | Performed by: SURGERY

## 2024-10-09 ASSESSMENT — ENCOUNTER SYMPTOMS
RECTAL PAIN: 1
ANAL BLEEDING: 1

## 2024-10-09 NOTE — PROGRESS NOTES
Subjective   Patient ID: Victor Hugo Esposito is a 64 y.o. male who presents for Hemorrhoids.  HPI  64-year-old male up-to-date on colonoscopy complaining of symptomatic external hemorrhoid for the past 6 months only partial relief with topical therapy complaining of bleeding difficulty with wiping and pain with sitting  Review of Systems   Gastrointestinal:  Positive for anal bleeding and rectal pain.   All other systems reviewed and are negative.      Objective   Physical Exam  Genitourinary:     Comments: Declined chaperone large external skin tag/resolving external hemorrhoid    Physical Exam  Constitutional:       Appearance: Normal appearance.   HENT:      Head: Normocephalic and atraumatic.      Mouth/Throat:      Pharynx: Oropharynx is clear.   Eyes:      Pupils: Pupils are equal, round, and reactive to light.   Cardiovascular:      Rate and Rhythm: Normal rate and regular rhythm.   Pulmonary:      Effort: Pulmonary effort is normal.      Breath sounds: Normal breath sounds.   Abdominal:      General: Abdomen is flat. Bowel sounds are normal.      Palpations: Abdomen is soft.   Musculoskeletal:         General: Normal range of motion.      Cervical back: Normal range of motion.   Skin:     General: Skin is warm.   Neurological:      General: No focal deficit present.      Mental Status: She is alert. Mental status is at baseline.   Psychiatric:         Mood and Affect: Mood normal.       Assessment/Plan   Symptomatic skin tag/resolving external hemorrhoid discussed in detail with the patient do not recommend excision at this time due to suboptimal recovery most instances we will see back in 4 weeks continue local treatment he is agreeable with plan         Peter Kang MD 10/09/24 9:20 AM

## 2024-11-06 ENCOUNTER — APPOINTMENT (OUTPATIENT)
Dept: SURGERY | Facility: CLINIC | Age: 64
End: 2024-11-06
Payer: MEDICARE

## 2024-11-23 ENCOUNTER — ANCILLARY PROCEDURE (OUTPATIENT)
Dept: URGENT CARE | Age: 64
End: 2024-11-23
Payer: MEDICARE

## 2024-11-23 ENCOUNTER — OFFICE VISIT (OUTPATIENT)
Dept: URGENT CARE | Age: 64
End: 2024-11-23
Payer: MEDICARE

## 2024-11-23 VITALS
DIASTOLIC BLOOD PRESSURE: 108 MMHG | HEART RATE: 68 BPM | OXYGEN SATURATION: 96 % | HEIGHT: 64 IN | RESPIRATION RATE: 16 BRPM | SYSTOLIC BLOOD PRESSURE: 170 MMHG | BODY MASS INDEX: 32.78 KG/M2 | TEMPERATURE: 97.7 F | WEIGHT: 192 LBS

## 2024-11-23 DIAGNOSIS — M25.572 ACUTE LEFT ANKLE PAIN: ICD-10-CM

## 2024-11-23 DIAGNOSIS — S93.402A SPRAIN OF LEFT ANKLE, UNSPECIFIED LIGAMENT, INITIAL ENCOUNTER: ICD-10-CM

## 2024-11-23 DIAGNOSIS — M25.572 ACUTE LEFT ANKLE PAIN: Primary | ICD-10-CM

## 2024-11-23 PROCEDURE — 73610 X-RAY EXAM OF ANKLE: CPT | Mod: LEFT SIDE | Performed by: NURSE PRACTITIONER

## 2024-11-23 ASSESSMENT — PATIENT HEALTH QUESTIONNAIRE - PHQ9
1. LITTLE INTEREST OR PLEASURE IN DOING THINGS: NOT AT ALL
2. FEELING DOWN, DEPRESSED OR HOPELESS: NOT AT ALL
SUM OF ALL RESPONSES TO PHQ9 QUESTIONS 1 AND 2: 0

## 2024-11-23 NOTE — PROGRESS NOTES
"Subjective   Patient ID: Victor Hugo Esposito is a 64 y.o. male. They present today with a chief complaint of Injury (Twisted left ankle x1wk ).    History of Present Illness  Patient twisted the left ankle 1 week ago and here for evaluation and he has continue pain in the ankle and pain with ambulation. (+) pedal pulse and <2 sec. No numbness or tingling in the foot. Neurovasc intact.   Pt blood pressure elevated. Pt states \"I have white coat syndrome and my blood pressure is always high, my doctor is aware.\" Pt denies any associated symptoms with elevation in blood pressure. Pt blood pressure normal at PCP visit in October. Discussed dangers of uncontrolled BP with patient. Advised will recheck at end of visit and could be pain related too.       Injury      Past Medical History  Allergies as of 11/23/2024 - Reviewed 11/23/2024   Allergen Reaction Noted    Bee venom protein (honey bee) Hives 10/08/2016    Clonazepam Other and Unknown 01/07/2012       (Not in a hospital admission)       Past Medical History:   Diagnosis Date    Allergy status to unspecified drugs, medicaments and biological substances     History of allergic reaction    Arthritis     Bronchitis     Bronchospasm     COVID-19     VACCINATED    GERD (gastroesophageal reflux disease)     Hyperlipidemia     Hypertension     Hypothyroidism     Nasal congestion 02/25/2020    Nasal congestion with rhinorrhea    Pain in unspecified hand 11/23/2021    Hand pain    Phimosis     RLS (restless legs syndrome)     Sleep apnea     NO CPAP    Strain of muscle, fascia and tendon of the posterior muscle group at thigh level, unspecified thigh, initial encounter 04/21/2020    Hamstring strain    Synovitis and tenosynovitis, unspecified 11/23/2021    Tenosynovitis of hand    Wears glasses        Past Surgical History:   Procedure Laterality Date    OTHER SURGICAL HISTORY  02/25/2020    No history of surgery    TONSILLECTOMY      US ASPIRATION INJECTION INTERMEDIATE JOINT  " "05/01/2020    US ASPIRATION INJECTION INTERMEDIATE JOINT 5/1/2020 [facility]        reports that he has never smoked. He has never used smokeless tobacco. He reports current alcohol use. He reports that he does not use drugs.    Review of Systems  Review of Systems     10 point ROS completed and all are negative other than what is stated in the current HPI                            Objective    Vitals:    11/23/24 1323 11/23/24 1330   BP: (!) 198/122 (!) 170/108   Pulse: 68    Resp: 16    Temp: 36.5 °C (97.7 °F)    TempSrc: Oral    SpO2: 96%    Weight: 87.1 kg (192 lb)    Height: 1.626 m (5' 4\")      No LMP for male patient.    Physical Exam  Vitals and nursing note reviewed.   Constitutional:       Appearance: Normal appearance.   Neck:      Vascular: No carotid bruit.      Comments: Neg jvd  Cardiovascular:      Rate and Rhythm: Normal rate and regular rhythm.      Heart sounds: Normal heart sounds.   Pulmonary:      Effort: Pulmonary effort is normal.      Breath sounds: Normal breath sounds.   Musculoskeletal:      Cervical back: No tenderness.      Right ankle: Normal.      Left ankle: Swelling present. No deformity or ecchymosis. Tenderness present over the lateral malleolus. Normal pulse.      Left Achilles Tendon: Normal.   Skin:     General: Skin is warm and dry.   Neurological:      Mental Status: He is alert.         Procedures    Point of Care Test & Imaging Results from this visit  No results found for this visit on 11/23/24.   XR ankle left 3+ views    Result Date: 11/23/2024  Interpreted By:  Mckinley Cabral, STUDY: XR ANKLE LEFT 3+ VIEWS;  11/23/2024 1:47 pm   INDICATION: Signs/Symptoms:Injury 1 week ago; continued pain and difficulty walking.   COMPARISON: None.   ACCESSION NUMBER(S): [id number]   ORDERING CLINICIAN: MALCOLM MOORE   FINDINGS: No acute fracture or dislocation. Joint space appears preserved. Tiny calcaneal enthesophytes.       No acute osseous injury of the left ankle " identified.   MACRO: None   Signed by: Mckinley Cabral 11/23/2024 1:51 PM Dictation workstation:   [id number]     Diagnostic study results (if any) were reviewed by FLORA Hanna.    Assessment/Plan   Allergies, medications, history, and pertinent labs/EKGs/Imaging reviewed by FLORA Hanna.     Medical Decision Making  Left Ankle Sprain:   - X-ray completed; no fracture  - Ace wrap to the ankle (patient has at home)  - RICE (Rest, Ice, compress with ace wrap and elevated)  - Advised on healing time  - Tylenol/Motrin as needed for pain  - Avoid any activity that will worsen the pain  - Follow-up for re-evaluation with PCP in the next 7 days    Orders and Diagnoses  Diagnoses and all orders for this visit:  Acute left ankle pain  -     XR ankle left 3+ views; Future  Sprain of left ankle, unspecified ligament, initial encounter      Medical Admin Record      Patient disposition: Home    Electronically signed by FLORA Hanna  2:03 PM

## 2024-11-23 NOTE — PATIENT INSTRUCTIONS
Left Ankle Sprain:   - X-ray completed; no fracture  - Ace wrap to the ankle (patient has at home)  - RICE (Rest, Ice, compress with ace wrap and elevated)  - Advised on healing time  - Tylenol/Motrin as needed for pain  - Avoid any activity that will worsen the pain  - Follow-up for re-evaluation with PCP in the next 7 days

## 2024-12-09 ENCOUNTER — OFFICE VISIT (OUTPATIENT)
Dept: ORTHOPEDIC SURGERY | Facility: CLINIC | Age: 64
End: 2024-12-09
Payer: MEDICARE

## 2024-12-09 ENCOUNTER — HOSPITAL ENCOUNTER (OUTPATIENT)
Dept: RADIOLOGY | Facility: CLINIC | Age: 64
Discharge: HOME | End: 2024-12-09
Payer: MEDICARE

## 2024-12-09 DIAGNOSIS — M79.642 LEFT HAND PAIN: ICD-10-CM

## 2024-12-09 DIAGNOSIS — M15.9 GENERALIZED OSTEOARTHRITIS OF HAND: Primary | ICD-10-CM

## 2024-12-09 PROCEDURE — 99214 OFFICE O/P EST MOD 30 MIN: CPT | Performed by: STUDENT IN AN ORGANIZED HEALTH CARE EDUCATION/TRAINING PROGRAM

## 2024-12-09 PROCEDURE — 73130 X-RAY EXAM OF HAND: CPT | Mod: LT

## 2024-12-09 PROCEDURE — L3908 WHO COCK-UP NONMOLDE PRE OTS: HCPCS | Performed by: STUDENT IN AN ORGANIZED HEALTH CARE EDUCATION/TRAINING PROGRAM

## 2024-12-09 PROCEDURE — 1123F ACP DISCUSS/DSCN MKR DOCD: CPT | Performed by: STUDENT IN AN ORGANIZED HEALTH CARE EDUCATION/TRAINING PROGRAM

## 2024-12-09 PROCEDURE — 73130 X-RAY EXAM OF HAND: CPT | Mod: LEFT SIDE | Performed by: STUDENT IN AN ORGANIZED HEALTH CARE EDUCATION/TRAINING PROGRAM

## 2024-12-09 RX ORDER — METHYLPREDNISOLONE 4 MG/1
TABLET ORAL
Qty: 1 EACH | Refills: 0 | Status: SHIPPED | OUTPATIENT
Start: 2024-12-09

## 2024-12-09 NOTE — PROGRESS NOTES
Acute Injury New Patient Visit    HPI: Victor Hugo is a 64 y.o.male who presents today with new complaints of left hand pain and swelling for about 1 week.  He denies any falls or injuries.  He has tried naproxen and Voltaren gel.  He is having left hand pain overlying the MCP joints of the second and third fingers.  He denies any numbness and tingling.  He denies any falls or injuries.  He is not diabetic.    Plan: For this exacerbation of left hand osteoarthritis, primarily concentrated at the second and third MCP joints, we will place him in a trauma splint, wear this for about a week, coming out in order to not become stiff, start him on a Medrol Dosepak, rest, ice, and elevation, follow-up in 2 to 3 weeks.  No repeat x-rays unless he is not doing better    Assessment:   Problem List Items Addressed This Visit    None  Visit Diagnoses       Generalized osteoarthritis of hand    -  Primary    Relevant Medications    methylPREDNISolone (Medrol Dospak) 4 mg tablets    Other Relevant Orders    Wrist splint    Left hand pain        Relevant Orders    XR hand left 3+ views            Diagnostics: Reviewed all relevant imaging including x-ray, MRI, CT, and US.      Procedure:  Procedures    Physical Exam:  GENERAL:  No obvious acute distress.  NEURO:  Distally neurovascularly intact.  Sensation intact to light touch.  Extremity: Left hand and wrist exam:  Skin healthy and intact  No gross swelling or ecchymosis  No erythema or warmth  TENDER over the second and third MCP joints on the dorsal aspect of the hand with some weakness of  strength, but no significant swelling or bruising, erythema, warmth, or signs of infection  Volar flexion, dorsiflexion, pronation/supination without limitation  No tenderness to palpation over distal radius  No tenderness to palpation over distal ulna or TFCC  No tenderness to palpation over the scaphoid  Negative piano key sign  Negative Finkelstein test  Negative Holly's  test  Neurovascular exam normal distally    Orders Placed This Encounter    Wrist splint    XR hand left 3+ views    methylPREDNISolone (Medrol Dospak) 4 mg tablets      At the conclusion of the visit there were no further questions by the patient/family regarding their plan of care.  Patient was instructed to call or return with any issues, questions, or concerns regarding their injury and/or treatment plan described above.     12/09/24 at 12:28 PM - Sridhar Leslie, DO    Office: [phone]    This note was prepared using voice recognition software.  The details of this note are correct and have been reviewed, and corrected to the best of my ability.  Some grammatical errors may persist related to the Dragon software.

## 2025-01-02 ENCOUNTER — OFFICE VISIT (OUTPATIENT)
Dept: ORTHOPEDIC SURGERY | Facility: CLINIC | Age: 65
End: 2025-01-02
Payer: MEDICARE

## 2025-01-02 DIAGNOSIS — M15.9 GENERALIZED OSTEOARTHRITIS OF HAND: ICD-10-CM

## 2025-01-02 DIAGNOSIS — M19.042 ARTHRITIS OF LEFT HAND: Primary | ICD-10-CM

## 2025-01-02 PROCEDURE — 99213 OFFICE O/P EST LOW 20 MIN: CPT | Performed by: STUDENT IN AN ORGANIZED HEALTH CARE EDUCATION/TRAINING PROGRAM

## 2025-01-02 PROCEDURE — 1123F ACP DISCUSS/DSCN MKR DOCD: CPT | Performed by: STUDENT IN AN ORGANIZED HEALTH CARE EDUCATION/TRAINING PROGRAM

## 2025-01-02 RX ORDER — MELOXICAM 15 MG/1
15 TABLET ORAL DAILY
Qty: 30 TABLET | Refills: 1 | Status: SHIPPED | OUTPATIENT
Start: 2025-01-02 | End: 2025-03-03

## 2025-01-02 ASSESSMENT — PATIENT HEALTH QUESTIONNAIRE - PHQ9
2. FEELING DOWN, DEPRESSED OR HOPELESS: NOT AT ALL
SUM OF ALL RESPONSES TO PHQ9 QUESTIONS 1 AND 2: 0
1. LITTLE INTEREST OR PLEASURE IN DOING THINGS: NOT AT ALL

## 2025-01-02 NOTE — PROGRESS NOTES
Acute Injury New Patient Visit    HPI: Victor Hugo is a 64 y.o.male who presents today for follow-up of his exacerbation of left hand osteoarthritis, concentrated in the second and third MCP joints.  He was having some initial relief from the steroid and rest, but felt as though the weather has been making it worse recently.  He has not been on any anti-inflammatories.  He denies any interval falls or injuries.  He denies any significant swelling, bruising, erythema, or warmth.    On 12/9/2024, he presented with for follow-up of his with new complaints of left hand pain and swelling for about 1 week.  He denies any falls or injuries.  He has tried naproxen and Voltaren gel.  He is having left hand pain overlying the MCP joints of the second and third fingers.  He denies any numbness and tingling.  He denies any falls or injuries.  He is not diabetic.    Plan: Today, on 1/2/2025, we will transition him over to meloxicam for several weeks to see if we can achieve some good anti-inflammatory effect, continue activity modifications, rest, ice, heat, and follow-up in 3 to 4 weeks.  If he is not better at that time, I would consider referring him to one of our hand surgeon specialist.    On 12/9/2024, for this exacerbation of left hand osteoarthritis, primarily concentrated at the second and third MCP joints, we will place him in a trauma splint, wear this for about a week, coming out in order to not become stiff, start him on a Medrol Dosepak, rest, ice, and elevation, follow-up in 2 to 3 weeks.  No repeat x-rays unless he is not doing better    Assessment:   Problem List Items Addressed This Visit    None  Visit Diagnoses       Arthritis of left hand    -  Primary    Generalized osteoarthritis of hand                  Diagnostics: Reviewed all relevant imaging including x-ray, MRI, CT, and US.      Procedure:  Procedures    Physical Exam:  GENERAL:  No obvious acute distress.  NEURO:  Distally neurovascularly intact.   Sensation intact to light touch.  Extremity: Left hand and wrist exam:  Skin healthy and intact  No gross swelling or ecchymosis  No erythema or warmth  Vastly improved TENDER over the second and third MCP joints on the dorsal aspect of the hand with some weakness of  strength, but no significant swelling or bruising, erythema, warmth, or signs of infection  Volar flexion, dorsiflexion, pronation/supination without limitation  No tenderness to palpation over distal radius  No tenderness to palpation over distal ulna or TFCC  No tenderness to palpation over the scaphoid  Negative piano key sign  Negative Finkelstein test  Negative Holly's test  Neurovascular exam normal distally    No orders of the defined types were placed in this encounter.     At the conclusion of the visit there were no further questions by the patient/family regarding their plan of care.  Patient was instructed to call or return with any issues, questions, or concerns regarding their injury and/or treatment plan described above.     01/02/25 at 6:29 PM - Sridhar Leslie,     Office: [phone]    This note was prepared using voice recognition software.  The details of this note are correct and have been reviewed, and corrected to the best of my ability.  Some grammatical errors may persist related to the Dragon software.

## 2025-01-06 DIAGNOSIS — G25.81 RLS (RESTLESS LEGS SYNDROME): ICD-10-CM

## 2025-01-06 DIAGNOSIS — B00.9 HERPES SIMPLEX DISEASE: ICD-10-CM

## 2025-01-06 RX ORDER — ACYCLOVIR 400 MG/1
400 TABLET ORAL 3 TIMES DAILY
Qty: 30 TABLET | Refills: 0 | Status: SHIPPED | OUTPATIENT
Start: 2025-01-06 | End: 2025-01-16

## 2025-01-06 RX ORDER — ROPINIROLE 3 MG/1
3 TABLET, FILM COATED ORAL NIGHTLY
Qty: 90 TABLET | Refills: 3 | Status: SHIPPED | OUTPATIENT
Start: 2025-01-06

## 2025-01-06 NOTE — TELEPHONE ENCOUNTER
Rx Refill Request Telephone Encounter    Name:  Victor Hugo Esposito  :  [id number]  Medication Name:    acyclovir (Zovirax) 400 mg tablet   rOPINIRole (Requip) 3 mg tablet     PT REQUESTING 90 DAY SUPPLY, PER INSURANCE    Specific Pharmacy location:  [facility]   Date of last appointment:  10/1/24  Date of next appointment:  N/A  Best number to reach patient:  [phone]

## 2025-01-22 ENCOUNTER — TELEPHONE (OUTPATIENT)
Dept: PRIMARY CARE | Facility: CLINIC | Age: 65
End: 2025-01-22
Payer: MEDICARE

## 2025-01-22 DIAGNOSIS — J01.00 ACUTE NON-RECURRENT MAXILLARY SINUSITIS: Primary | ICD-10-CM

## 2025-01-22 RX ORDER — AZITHROMYCIN 250 MG/1
TABLET, FILM COATED ORAL
Qty: 6 TABLET | Refills: 0 | Status: SHIPPED | OUTPATIENT
Start: 2025-01-22

## 2025-01-22 NOTE — TELEPHONE ENCOUNTER
PATIENT CALLED ASKING IF DR KIDD COULD CALL HIM IN SOMETHING FOR HEADACHE BODY ACHE DRY MOUTH FATIGUE. [facility] IS PREFERRED [facility]. HE DOES NOT HAVE ANY COVID TESTS TO TEST HIMSELF.

## 2025-01-30 ENCOUNTER — APPOINTMENT (OUTPATIENT)
Dept: ORTHOPEDIC SURGERY | Facility: CLINIC | Age: 65
End: 2025-01-30
Payer: MEDICARE

## 2025-04-08 DIAGNOSIS — B00.9 HERPES SIMPLEX DISEASE: ICD-10-CM

## 2025-04-08 RX ORDER — ACYCLOVIR 400 MG/1
400 TABLET ORAL 3 TIMES DAILY
Qty: 30 TABLET | Refills: 0 | Status: SHIPPED | OUTPATIENT
Start: 2025-04-08 | End: 2025-04-18

## 2025-04-08 NOTE — TELEPHONE ENCOUNTER
acyclovir (Zovirax) 400 mg tablet (Discontinued)        Sig: Take 1 tablet (400 mg) by mouth 3 times a day for 5 days.        Sent to pharmacy as: acyclovir 400 mg tablet (Zovirax)            Patient called requesting medication refill for   Above medication   Please advse

## 2025-05-07 ENCOUNTER — APPOINTMENT (OUTPATIENT)
Dept: PRIMARY CARE | Facility: CLINIC | Age: 65
End: 2025-05-07
Payer: MEDICARE

## 2025-05-07 VITALS
HEART RATE: 94 BPM | WEIGHT: 199 LBS | HEIGHT: 64 IN | BODY MASS INDEX: 33.97 KG/M2 | SYSTOLIC BLOOD PRESSURE: 136 MMHG | DIASTOLIC BLOOD PRESSURE: 70 MMHG | OXYGEN SATURATION: 97 % | TEMPERATURE: 97.5 F | RESPIRATION RATE: 16 BRPM

## 2025-05-07 DIAGNOSIS — G25.81 RLS (RESTLESS LEGS SYNDROME): ICD-10-CM

## 2025-05-07 DIAGNOSIS — Z00.00 MEDICARE ANNUAL WELLNESS VISIT, INITIAL: Primary | ICD-10-CM

## 2025-05-07 DIAGNOSIS — Z00.00 ROUTINE GENERAL MEDICAL EXAMINATION AT HEALTH CARE FACILITY: ICD-10-CM

## 2025-05-07 DIAGNOSIS — B00.9 HERPES SIMPLEX DISEASE: ICD-10-CM

## 2025-05-07 DIAGNOSIS — Z12.5 PROSTATE CANCER SCREENING: ICD-10-CM

## 2025-05-07 DIAGNOSIS — E78.2 MIXED HYPERLIPIDEMIA: ICD-10-CM

## 2025-05-07 DIAGNOSIS — S93.492D HIGH ANKLE SPRAIN OF LEFT LOWER EXTREMITY, SUBSEQUENT ENCOUNTER: ICD-10-CM

## 2025-05-07 DIAGNOSIS — E03.9 HYPOTHYROIDISM IN ADULT: ICD-10-CM

## 2025-05-07 DIAGNOSIS — R53.1 WEAKNESS: ICD-10-CM

## 2025-05-07 DIAGNOSIS — R03.0 BORDERLINE HYPERTENSION: ICD-10-CM

## 2025-05-07 PROCEDURE — 1170F FXNL STATUS ASSESSED: CPT | Performed by: FAMILY MEDICINE

## 2025-05-07 PROCEDURE — 99212 OFFICE O/P EST SF 10 MIN: CPT | Performed by: FAMILY MEDICINE

## 2025-05-07 PROCEDURE — G0439 PPPS, SUBSEQ VISIT: HCPCS | Performed by: FAMILY MEDICINE

## 2025-05-07 PROCEDURE — 1036F TOBACCO NON-USER: CPT | Performed by: FAMILY MEDICINE

## 2025-05-07 PROCEDURE — 3008F BODY MASS INDEX DOCD: CPT | Performed by: FAMILY MEDICINE

## 2025-05-07 PROCEDURE — 1159F MED LIST DOCD IN RCRD: CPT | Performed by: FAMILY MEDICINE

## 2025-05-07 PROCEDURE — 1160F RVW MEDS BY RX/DR IN RCRD: CPT | Performed by: FAMILY MEDICINE

## 2025-05-07 RX ORDER — ACYCLOVIR 400 MG/1
400 TABLET ORAL 3 TIMES DAILY
Qty: 30 TABLET | Refills: 0 | Status: SHIPPED | OUTPATIENT
Start: 2025-05-07 | End: 2025-05-17

## 2025-05-07 ASSESSMENT — ACTIVITIES OF DAILY LIVING (ADL)
GROCERY_SHOPPING: INDEPENDENT
DOING_HOUSEWORK: INDEPENDENT
MANAGING_FINANCES: INDEPENDENT
TAKING_MEDICATION: INDEPENDENT
DRESSING: INDEPENDENT
BATHING: INDEPENDENT

## 2025-05-07 ASSESSMENT — ENCOUNTER SYMPTOMS
DEPRESSION: 0
LOSS OF SENSATION IN FEET: 0
OCCASIONAL FEELINGS OF UNSTEADINESS: 0

## 2025-05-07 NOTE — ASSESSMENT & PLAN NOTE
Orders:    Comprehensive metabolic panel; Future    Lipid panel; Future    CBC and Auto Differential; Future

## 2025-05-07 NOTE — PROGRESS NOTES
Subjective   Reason for Visit: Victor Hugo Esposito is an 64 y.o. male here for a Medicare Wellness visit and left ankle pain.               HPI  64-year-old gentleman is here for Medicare wellness review  Is followed by for previously treated internal hemorrhoids  The patient underwent extensive review very minimal low hemoglobin otherwise normal white count normal immunoglobulins normal chemistry normal thyroids he takes his 75 mcg Synthroid daily.  Really is need for his pneumococcal vaccine prefers to get it later this summer when seen otherwise no chest pain shortness with or palpitations no new GI  issues.  Tries to follow his low fat diet well  Off all blood pressure medicines and better low-salt diet his blood pressure has been stable  Remains on Requip 3 mg after supper for restless leg syndrome with good results  Takes 40 mg Zocor at bedtime for dyslipidemia.  Lipids Zovirax for bolus therapy and this was renewed  He was normal  2022 colonoscopy showed internal hemorrhoids which were later banded but otherwise is not due till other 10 years  No GI  red flags after careful review no chest pain shortness with or palpitations  Patient Care Team:  Rajesh Anderson DO as PCP - General  Rajesh Anderson DO as PCP - O Medicare Advantage PCP     Review of Systems   Constitutional:  Negative for fatigue, fever and unexpected weight change.   HENT:  Positive for rhinorrhea. Negative for sinus pressure and sinus pain.    Respiratory:  Negative for cough, shortness of breath and wheezing.    Cardiovascular:  Negative for chest pain, palpitations and leg swelling.   Gastrointestinal:  Negative for abdominal pain, blood in stool, nausea and vomiting.   Genitourinary:  Positive for frequency. Negative for dysuria, flank pain and hematuria.   Musculoskeletal:  Positive for arthralgias (Holiday season had a sprain of his left ankle still has pain in the anterior medial aspect high above the ankle joint.  No redness swelling no  "edema no numbness or weakness but pain toward the end of the day otherwise no other joint swelling) and myalgias.   Skin:  Negative for rash.   Neurological:  Negative for light-headedness, numbness and headaches.   Psychiatric/Behavioral:  Negative for confusion, sleep disturbance and suicidal ideas.        Objective   Vitals:  /70 (BP Location: Right arm, Patient Position: Sitting, BP Cuff Size: Large adult)   Pulse 94   Temp 36.4 °C (97.5 °F) (Temporal)   Resp 16   Ht 1.626 m (5' 4\")   Wt 90.3 kg (199 lb)   SpO2 97%   BMI 34.16 kg/m²     XR ankle left 3+ views  Status: Final result     PACS Images     Show images for XR ankle left 3+ views  Signed by    Signed Time Phone Pager   Mckinley Cabral MD 11/23/2024 13:51 [phone]     Exam Information    Status Exam Begun Exam Ended   Final 11/23/2024 13:40 11/23/2024 13:47     Study Result    Narrative & Impression   Interpreted By:  Mckinley Cabral,   STUDY:  XR ANKLE LEFT 3+ VIEWS;  11/23/2024 1:47 pm      INDICATION:  Signs/Symptoms:Injury 1 week ago; continued pain and difficulty  walking.      COMPARISON:  None.      ACCESSION NUMBER(S):  [id number]      ORDERING CLINICIAN:  MALCOLM MOORE      FINDINGS:  No acute fracture or dislocation. Joint space appears preserved. Tiny  calcaneal enthesophytes.      IMPRESSION:  No acute osseous injury of the left ankle identified.      MACRO:  None      Signed by: Mckinley Cabral 11/23/2024 1:51 PM  Dictation workstation:   [id number]     Result History  rostate Specific Antigen, Screen  Order: [id number]   Status: Final result    Test Result Released: No (inaccessible in [facility])    0 Result Notes      Component  Ref Range & Units 3 yr ago   Prostate Specific Antigen,Screen  0.00 - 4.00 ng/mL 0.60   Comment: The FDA requires that the method used for PSA          Physical Exam  Vital sign reviewed and normal blood pressure is good in the left arm  Musculoskeletal of the tenderness reproduced over the " medial mall between the malleolus as well as the high tibial talar junction.  No edema distal pulses are intact no sensorimotor vascular deficits  Cardiovascular RSR without murmurs gallop or ectopy  Pulmonary chest clear anteriorly and posteriorly no masses adenopathy bruits or rigidity  Assessment & Plan  Herpes simplex disease    Orders:    acyclovir (Zovirax) 400 mg tablet; Take 1 tablet (400 mg) by mouth 3 times a day for 10 days.    Mixed hyperlipidemia    Orders:    Comprehensive metabolic panel; Future    Lipid panel; Future    CBC and Auto Differential; Future    Borderline hypertension    Orders:    Comprehensive metabolic panel; Future    Lipid panel; Future    CBC and Auto Differential; Future    Prostate cancer screening    Orders:    PSA; Future    Weakness    Orders:    TSH; Future    Medicare annual wellness visit, initial         High ankle sprain of left lower extremity, subsequent encounter    Orders:    Follow Up In [facility] - PCP - Established; Future    Hypothyroidism in adult         RLS (restless legs syndrome)         Routine general medical examination at health care facility    Orders:    1 Year Follow Up In [facility] - PCP - Wellness Exam; Future            Will continue his current Synthroid.  Continue current Mobic if needed for arthralgia  Continue Zovirax for 10 days if needed for significant outbreak this was renewed  Continue Zocor 10 mg at night for dyslipidemia  Fatigue history of hypothyroidism and health maintenance will check CBC CHEM lipid TSH PSA  Weakness to the left ankle suggested lace ankle support by over the [place] this is to be used when he is on his feet for long periods of time hiking and or a lot of outdoor activities.  He can take Mobic otherwise no local worsening and notify me otherwise no ecchymosis or worrisome symptoms reviewed earlier labs in detail  Follow-up in 3 months it with the above previsit lab low-fat as well as low-salt diet  since blood pressure stable off meds also give him his Pneumovax-Prevnar 20 when seen calling parameters otherwise clinically stable  @discharge  The above diagnosis and treatment plan was discussed with the patient patient will continue appropriate diet and exercise as reviewed  Patient will recheck earlier if any interval problems of significance or clinical worsening of the above problems.  Agrees above surveillance.  All question were addressed regarding above meds

## 2025-05-11 PROBLEM — Z12.5 PROSTATE CANCER SCREENING: Status: ACTIVE | Noted: 2025-05-11

## 2025-05-11 PROBLEM — S93.492A HIGH ANKLE SPRAIN OF LEFT LOWER EXTREMITY: Status: ACTIVE | Noted: 2025-05-11

## 2025-05-11 PROBLEM — J20.9 BRONCHOSPASM WITH BRONCHITIS, ACUTE: Status: RESOLVED | Noted: 2023-08-31 | Resolved: 2025-05-11

## 2025-05-11 PROBLEM — J01.00 ACUTE MAXILLARY SINUSITIS: Status: RESOLVED | Noted: 2023-08-31 | Resolved: 2025-05-11

## 2025-05-11 PROBLEM — Z00.00 MEDICARE ANNUAL WELLNESS VISIT, INITIAL: Status: ACTIVE | Noted: 2025-05-11

## 2025-05-11 PROBLEM — R53.1 WEAKNESS: Status: ACTIVE | Noted: 2025-05-11

## 2025-05-11 ASSESSMENT — ENCOUNTER SYMPTOMS
SINUS PRESSURE: 0
FATIGUE: 0
CONFUSION: 0
RHINORRHEA: 1
COUGH: 0
VOMITING: 0
LIGHT-HEADEDNESS: 0
SHORTNESS OF BREATH: 0
MYALGIAS: 1
HEMATURIA: 0
SINUS PAIN: 0
SLEEP DISTURBANCE: 0
ARTHRALGIAS: 1
PALPITATIONS: 0
HEADACHES: 0
FREQUENCY: 1
NAUSEA: 0
NUMBNESS: 0
UNEXPECTED WEIGHT CHANGE: 0
DYSURIA: 0
FLANK PAIN: 0
WHEEZING: 0
ABDOMINAL PAIN: 0
BLOOD IN STOOL: 0
FEVER: 0

## 2025-05-21 ENCOUNTER — HOSPITAL ENCOUNTER (OUTPATIENT)
Dept: RADIOLOGY | Facility: CLINIC | Age: 65
Discharge: HOME | End: 2025-05-21
Payer: MEDICARE

## 2025-05-21 ENCOUNTER — OFFICE VISIT (OUTPATIENT)
Dept: ORTHOPEDIC SURGERY | Facility: CLINIC | Age: 65
End: 2025-05-21
Payer: MEDICARE

## 2025-05-21 DIAGNOSIS — M77.11 LATERAL EPICONDYLITIS OF RIGHT ELBOW: Primary | ICD-10-CM

## 2025-05-21 DIAGNOSIS — M25.521 RIGHT ELBOW PAIN: ICD-10-CM

## 2025-05-21 PROCEDURE — 73080 X-RAY EXAM OF ELBOW: CPT | Mod: RIGHT SIDE | Performed by: INTERNAL MEDICINE

## 2025-05-21 PROCEDURE — 99212 OFFICE O/P EST SF 10 MIN: CPT | Performed by: INTERNAL MEDICINE

## 2025-05-21 PROCEDURE — 99214 OFFICE O/P EST MOD 30 MIN: CPT | Performed by: INTERNAL MEDICINE

## 2025-05-21 PROCEDURE — 73080 X-RAY EXAM OF ELBOW: CPT | Mod: RT

## 2025-05-21 RX ORDER — METHYLPREDNISOLONE 4 MG/1
TABLET ORAL
Qty: 1 EACH | Refills: 0 | Status: SHIPPED | OUTPATIENT
Start: 2025-05-21

## 2025-05-21 NOTE — PROGRESS NOTES
Acute Injury New Patient Visit    CC: No chief complaint on file.      HPI: Victor Hugo is a 64 y.o. male presents today for evaluation for subacute right elbow pain which started about a month ago. No trauma or fall. He states that he does regular yard work. He is here for initial evaluation and x-rays.  Pain also wakes him up in the middle of the night.        Review of Systems   GENERAL: Negative for malaise, significant weight loss, fever  MUSCULOSKELETAL: See HPI  NEURO:  Negative for numbness / tingling     Past Medical History  Medical History[1]    Medication review  Medication Documentation Review Audit       Reviewed by Rajesh Anderson DO (Physician) on 05/11/25 at 1814      Medication Order Taking? Sig Documenting Provider Last Dose Status     Discontinued 05/07/25 1625   acyclovir (Zovirax) 400 mg tablet [id number]  Take 1 tablet (400 mg) by mouth 3 times a day for 10 days. Rajesh Anderson DO  Active   aspirin 81 mg EC tablet [id number] Yes Take 1 tablet (81 mg) by mouth once daily at bedtime. Historical Provider, MD  Active    Patient not taking:   Discontinued 05/11/25 1755   cetirizine (ZyrTEC) 10 mg tablet [id number] Yes Take 1 tablet (10 mg) by mouth once daily. Historical Provider, MD  Active   cholecalciferol (Vitamin D-3) 50 mcg (2,000 unit) capsule [id number] Yes Take 1 capsule (2,000 Units) by mouth early in the morning.. WINTER USE ONLY Historical Provider, MD  Active   diclofenac (Voltaren) 50 mg EC tablet [id number] Yes Take 1 tablet (50 mg) by mouth 2 times a day as needed (for pain). Augustus Stock MD  Active   docusate sodium (Colace) 100 mg capsule [id number] Yes Take 1 capsule (100 mg) by mouth every 12 hours. Historical Provider, MD  Active   EPINEPHrine 0.3 mg/0.3 mL injection syringe [id number] Yes Inject 0.3 mL (0.3 mg) into the muscle 1 time if needed for anaphylaxis. Historical Provider, MD  Active     Discontinued 12/15/23 1249   glucosamine HCl/chondroitin dewey (GLUCOSAMINE-CHONDROITIN ORAL)  [id number] Yes Take 1,200 mg by mouth once daily. Historical Provider, MD  Active   levothyroxine (Synthroid, Levoxyl) 75 mcg tablet [id number] Yes Take 1 tablet (75 mcg) by mouth once daily. as directed Rajesh Anderson, DO  Active   magnesium oxide (Mag-Ox) 400 mg tablet [id number] Yes Take 1 tablet (400 mg) by mouth once daily at bedtime. Historical Provider, MD  Active    Patient not taking:   Discontinued 05/11/25 1755   multivitamin with minerals (Adults Multivitamin) tablet [id number] Yes Take 1 tablet by mouth once daily. Historical Provider, MD  Active   rOPINIRole (Requip) 3 mg tablet [id number] Yes Take 1 tablet (3 mg) by mouth once daily at bedtime. Rajesh Anderson, DO  Active   simvastatin (Zocor) 40 mg tablet [id number] Yes Take 1 tablet (40 mg) by mouth once daily. Rajesh Anderson, DO  Active                    Allergies  RX Allergies[2]    Social History  Social History     Socioeconomic History    Marital status:      Spouse name: Not on file    Number of children: Not on file    Years of education: Not on file    Highest education level: Not on file   Occupational History    Not on file   Tobacco Use    Smoking status: Never    Smokeless tobacco: Never   Vaping Use    Vaping status: Never Used   Substance and Sexual Activity    Alcohol use: Yes     Comment: OCCASIONAL BEER    Drug use: Never    Sexual activity: Yes     Partners: Female   Other Topics Concern    Not on file   Social History Narrative    Not on file     Social Drivers of Health     Financial Resource Strain: Low Risk  (10/6/2020)    Received from [facility]    Overall Financial Resource Strain (CARDIA)     Difficulty of Paying Living Expenses: Not hard at all   Food Insecurity: No Food Insecurity (10/6/2020)    Received from [facility]    Hunger Vital Sign     Worried About Running Out of Food in the Last Year: Never true     Ran Out of Food in the Last Year: Never true   Transportation Needs:  No Transportation Needs (10/6/2020)    [facility]    [facility] - [facility]     Lack of Transportation (Medical): No     Lack of Transportation (Non-Medical): No   Physical Activity: Not on file   Stress: Not on file   Social Connections: Not on file   Intimate Partner Violence: Not on file   Housing Stability: Not on file       Surgical History  Surgical History[3]    Physical Exam:  GENERAL:  Patient is awake, alert, and oriented to person place and time.  Patient appears well nourished and well kept.  Affect Calm, Not Acutely Distressed.  HEENT:  Normocephalic, Atraumatic, EOMI  CARDIOVASCULAR:  Hemodynamically stable.  RESPIRATORY:  Normal respirations with unlabored breathing.  Extremity: Right elbow shows skin is intact.  There is no erythema or warmth.  There is no clinical signs of infection.  Pain mainly over the lateral epicondyle.  No pain over the medial epicondyle.  Pain with resistant pronation and supination.  Pain with resistant extension of the right wrist.  Distal biceps tendon intact.  Distal triceps tendon intact.  Negative valgus stress of the right elbow.  Left elbow was examined for comparison.      Diagnostics: X-rays reviewed      Procedure: None    Assessment: Right elbow lateral epicondylitis    Plan: Victor Hugo presents today for evaluation for subacute right elbow pain which started about a month ago secondary to lateral epicondylitis. No trauma or fall.  We recommended physical therapy and a Medrol Dosepak for the acute inflammation.  He will wear a wrist pro brace at bedtime, and a tennis elbow strap throughout the daytime.  He will follow-up in 4 weeks for reevaluation.  If no improvement may consider possible corticosteroid injection.  We discussed the possibility of iontophoresis and PRP injections.    No orders of the defined types were placed in this encounter.     At the conclusion of the visit there were no further questions by the  patient/family regarding their plan of care.  Patient was instructed to call or return with any issues, questions, or concerns regarding their injury and/or treatment plan described above.     05/21/25 at 8:15 AM - Jin Thibodeaux MD    Office: [phone]    We already utilize the scribe attestation, Dank LEWIS, am scribing for, and in the presence of Dr. Thibodeaux.    IJin MD personally performed the services described in the documentation as scribed by Dank Jiménez in my presence, and confirm it is both accurate and complete.    This note was prepared using voice recognition software.  The details of this note are correct and have been reviewed, and corrected to the best of my ability.  Some grammatical errors may persist related to the Dragon software.         [1]   Past Medical History:  Diagnosis Date    Allergy status to unspecified drugs, medicaments and biological substances     History of allergic reaction    Arthritis     Bronchitis     Bronchospasm     COVID-19     VACCINATED    GERD (gastroesophageal reflux disease)     Hyperlipidemia     Hypertension     Hypothyroidism     Nasal congestion 02/25/2020    Nasal congestion with rhinorrhea    Pain in unspecified hand 11/23/2021    Hand pain    Phimosis     RLS (restless legs syndrome)     Sleep apnea     NO CPAP    Strain of muscle, fascia and tendon of the posterior muscle group at thigh level, unspecified thigh, initial encounter 04/21/2020    Hamstring strain    Synovitis and tenosynovitis, unspecified 11/23/2021    Tenosynovitis of hand    Wears glasses    [2]   Allergies  Allergen Reactions    Bee Venom Protein (Honey Bee) Hives    Clonazepam Other and Unknown     Thoughts of suicide    Elevated CNS   Thoughts of suicide     Elevated CNS   Thoughts of suicide   [3]   Past Surgical History:  Procedure Laterality Date    OTHER SURGICAL HISTORY  02/25/2020    No history of surgery    TONSILLECTOMY      US ASPIRATION INJECTION  INTERMEDIATE JOINT  05/01/2020    US ASPIRATION INJECTION INTERMEDIATE JOINT 5/1/2020 [facility]

## 2025-06-12 ENCOUNTER — OFFICE VISIT (OUTPATIENT)
Dept: ORTHOPEDIC SURGERY | Facility: CLINIC | Age: 65
End: 2025-06-12
Payer: MEDICARE

## 2025-06-12 ENCOUNTER — TELEPHONE (OUTPATIENT)
Dept: ORTHOPEDIC SURGERY | Facility: CLINIC | Age: 65
End: 2025-06-12
Payer: MEDICARE

## 2025-06-12 DIAGNOSIS — M77.11 LATERAL EPICONDYLITIS OF RIGHT ELBOW: Primary | ICD-10-CM

## 2025-06-12 DIAGNOSIS — M77.11 LATERAL EPICONDYLITIS OF RIGHT ELBOW: ICD-10-CM

## 2025-06-12 DIAGNOSIS — B05.9 MEASLES: ICD-10-CM

## 2025-06-12 DIAGNOSIS — Z11.59 MEASLES SCREENING: ICD-10-CM

## 2025-06-12 PROCEDURE — 20605 DRAIN/INJ JOINT/BURSA W/O US: CPT | Mod: RT | Performed by: INTERNAL MEDICINE

## 2025-06-12 PROCEDURE — 99212 OFFICE O/P EST SF 10 MIN: CPT | Mod: 25 | Performed by: INTERNAL MEDICINE

## 2025-06-12 PROCEDURE — 2500000004 HC RX 250 GENERAL PHARMACY W/ HCPCS (ALT 636 FOR OP/ED): Performed by: INTERNAL MEDICINE

## 2025-06-12 RX ORDER — HYDROCODONE BITARTRATE AND ACETAMINOPHEN 5; 325 MG/1; MG/1
1 TABLET ORAL EVERY 12 HOURS PRN
Qty: 14 TABLET | Refills: 0 | Status: SHIPPED | OUTPATIENT
Start: 2025-06-12 | End: 2025-06-19

## 2025-06-12 RX ORDER — LIDOCAINE HYDROCHLORIDE 10 MG/ML
2 INJECTION, SOLUTION INFILTRATION; PERINEURAL
Status: COMPLETED | OUTPATIENT
Start: 2025-06-12 | End: 2025-06-12

## 2025-06-12 RX ORDER — BETAMETHASONE SODIUM PHOSPHATE AND BETAMETHASONE ACETATE 3; 3 MG/ML; MG/ML
2 INJECTION, SUSPENSION INTRA-ARTICULAR; INTRALESIONAL; INTRAMUSCULAR; SOFT TISSUE
Status: COMPLETED | OUTPATIENT
Start: 2025-06-12 | End: 2025-06-12

## 2025-06-12 RX ADMIN — BETAMETHASONE SODIUM PHOSPHATE AND BETAMETHASONE ACETATE 2 ML: 3; 3 INJECTION, SUSPENSION INTRA-ARTICULAR; INTRALESIONAL; INTRAMUSCULAR at 16:57

## 2025-06-12 RX ADMIN — LIDOCAINE HYDROCHLORIDE 2 ML: 10 INJECTION, SOLUTION INFILTRATION; PERINEURAL at 16:57

## 2025-06-12 NOTE — PROGRESS NOTES
CC:   Chief Complaint   Patient presents with    Right Elbow - Follow-up     Lateral epicondylitis       HPI: Victor Hugo is a 64 y.o. male presents today for reevaluation for right elbow lateral epicondylitis. He states that his pain is getting worse after physical therapy, no relief with the oral steroids.         Review of Systems   GENERAL: Negative for malaise, significant weight loss, fever  MUSCULOSKELETAL: See HPI  NEURO:  Negative for numbness / tingling     Past Medical History  Medical History[1]    Medication review  Medication Documentation Review Audit       Reviewed by Patsy Zimmerman MA (Medical Assistant) on 06/12/25 at 1405      Medication Order Taking? Sig Documenting Provider Last Dose Status   aspirin 81 mg EC tablet [id number]  Take 1 tablet (81 mg) by mouth once daily at bedtime. Historical Provider, MD  Active   cetirizine (ZyrTEC) 10 mg tablet [id number] No Take 1 tablet (10 mg) by mouth once daily. Historical Provider, MD Taking Active   cholecalciferol (Vitamin D-3) 50 mcg (2,000 unit) capsule [id number] No Take 1 capsule (2,000 Units) by mouth early in the morning.. WINTER USE ONLY Historical Provider, MD Taking Active   diclofenac (Voltaren) 50 mg EC tablet [id number] No Take 1 tablet (50 mg) by mouth 2 times a day as needed (for pain). Augustus Stock MD Taking Active   docusate sodium (Colace) 100 mg capsule [id number] No Take 1 capsule (100 mg) by mouth every 12 hours. Historical Provider, MD Taking Active   EPINEPHrine 0.3 mg/0.3 mL injection syringe [id number] No Inject 0.3 mL (0.3 mg) into the muscle 1 time if needed for anaphylaxis. Historical Provider, MD Taking Active     Discontinued 12/15/23 1249   glucosamine HCl/chondroitin dewey (GLUCOSAMINE-CHONDROITIN ORAL) [id number] No Take 1,200 mg by mouth once daily. Historical Provider, MD Taking Active   levothyroxine (Synthroid, Levoxyl) 75 mcg tablet [id number] No Take 1 tablet (75 mcg) by mouth once daily. as directed Rajesh Anderson, DO  Taking Active   magnesium oxide (Mag-Ox) 400 mg tablet [id number] No Take 1 tablet (400 mg) by mouth once daily at bedtime. Historical Provider, MD Taking Active   methylPREDNISolone (Medrol Dospak) 4 mg tablets [id number]  Follow schedule on package instructions Jin Thibodeaux MD  Active   multivitamin with minerals (Adults Multivitamin) tablet [id number] No Take 1 tablet by mouth once daily. Historical Provider, MD Taking Active   rOPINIRole (Requip) 3 mg tablet [id number]  Take 1 tablet (3 mg) by mouth once daily at bedtime. Rajesh Anderson, DO  Active   simvastatin (Zocor) 40 mg tablet [id number] No Take 1 tablet (40 mg) by mouth once daily. Rajesh Anderson, DO Taking Active                    Allergies  RX Allergies[2]    Social History  Social History     Socioeconomic History    Marital status:      Spouse name: Not on file    Number of children: Not on file    Years of education: Not on file    Highest education level: Not on file   Occupational History    Not on file   Tobacco Use    Smoking status: Never    Smokeless tobacco: Never   Vaping Use    Vaping status: Never Used   Substance and Sexual Activity    Alcohol use: Yes     Comment: OCCASIONAL BEER    Drug use: Never    Sexual activity: Yes     Partners: Female   Other Topics Concern    Not on file   Social History Narrative    Not on file     Social Drivers of Health     Financial Resource Strain: Low Risk  (10/6/2020)    Received from [facility]    Overall Financial Resource Strain (CARDIA)     Difficulty of Paying Living Expenses: Not hard at all   Food Insecurity: No Food Insecurity (10/6/2020)    Received from [facility]    Hunger Vital Sign     Worried About Running Out of Food in the Last Year: Never true     Ran Out of Food in the Last Year: Never true   Transportation Needs: No Transportation Needs (10/6/2020)    Received from [facility]    PRAPARE - Transportation     Lack  of Transportation (Medical): No     Lack of Transportation (Non-Medical): No   Physical Activity: Not on file   Stress: Not on file   Social Connections: Not on file   Intimate Partner Violence: Not on file   Housing Stability: Not on file       Surgical History  Surgical History[3]    Physical Exam:  GENERAL:  Patient is awake, alert, and oriented to person place and time.  Patient appears well nourished and well kept.  Affect Calm, Not Acutely Distressed.  HEENT:  Normocephalic, Atraumatic, EOMI  CARDIOVASCULAR:  Hemodynamically stable.  RESPIRATORY:  Normal respirations with unlabored breathing.  Extremity: Right elbow shows skin is intact.  There is no erythema or warmth.  There is no clinical signs of infection.  Pain mainly over the lateral epicondyle.  No pain over the medial epicondyle.  Pain with resistant pronation and supination.  Pain with resistant extension of the right wrist.  Distal biceps tendon intact.  Distal triceps tendon intact.  Negative valgus stress of the right elbow.  Left elbow was examined for comparison.       Diagnostics: None today   XR elbow right 3+ views  Interpreted By:  Jin Silva,   STUDY:  XR ELBOW RIGHT 3+ VIEWS, 5/21/2025 8:25 am      INDICATION:  Signs/Symptoms:pain      ACCESSION NUMBER(S):  [id number]      ORDERING CLINICIAN:  JIN SILVA      FINDINGS:  Right elbow x-rays three views AP, lateral and oblique view: No acute  fractures, no dislocation. No joint effusion.          Signed by: Jin Silva 5/21/2025 11:04 AM  Dictation workstation:   [id number]        Procedure: M Inj/Asp: R elbow on 6/12/2025 4:57 PM  Indications: pain  Details: 22 G needle, lateral approach  Medications: 2 mL betamethasone acet,sod phos 6 mg/mL; 2 mL lidocaine 10 mg/mL (1 %)  Outcome: tolerated well, no immediate complications  Procedure, treatment alternatives, risks and benefits explained, specific risks discussed. Consent was given by the patient. Immediately prior to  procedure a time out was called to verify the correct patient, procedure, equipment, support staff and site/side marked as required. Patient was prepped and draped in the usual sterile fashion.             Assessment:  Right elbow lateral epicondylitis     Plan: Victor Hugo presents today for reevaluation for right elbow lateral epicondylitis. He is till symptomatic.  No relief after physical therapy or oral steroids.  We did offer a corticosteroid injection to the right lateral epicondyle, risk and benefits of the procedure discussed.  He will follow-up in 4 weeks and reevaluate his elbow, if no improvement we discussed the possibility of iontophoresis or PRP injections.  May also consider possible Tenex procedure.  No orders of the defined types were placed in this encounter.     At the conclusion of the visit there were no further questions by the patient/family regarding their plan of care.  Patient was instructed to call or return with any issues, questions, or concerns regarding their injury and/or treatment plan described above.     06/12/25 at 2:08 PM - Jin Thibodeaux MD  Scribe Attestation  By signing my name below, Dank LEWIS Scribe   attest that this documentation has been prepared under the direction and in the presence of Jin Thibodeaux MD.    Office: [phone]    We already utilize the scribe attestation, Dank LEWIS, am scribing for, and in the presence of Dr. Thibodeaux.    Jin LEWIS MD personally performed the services described in the documentation as scribed by Dank Jiménez in my presence, and confirm it is both accurate and complete.    This note was prepared using voice recognition software.  The details of this note are correct and have been reviewed, and corrected to the best of my ability.  Some grammatical errors may persist related to the Dragon software.         [1]   Past Medical History:  Diagnosis Date    Allergy status to unspecified drugs, medicaments and  biological substances     History of allergic reaction    Arthritis     Bronchitis     Bronchospasm     COVID-19     VACCINATED    GERD (gastroesophageal reflux disease)     Hyperlipidemia     Hypertension     Hypothyroidism     Nasal congestion 02/25/2020    Nasal congestion with rhinorrhea    Pain in unspecified hand 11/23/2021    Hand pain    Phimosis     RLS (restless legs syndrome)     Sleep apnea     NO CPAP    Strain of muscle, fascia and tendon of the posterior muscle group at thigh level, unspecified thigh, initial encounter 04/21/2020    Hamstring strain    Synovitis and tenosynovitis, unspecified 11/23/2021    Tenosynovitis of hand    Wears glasses    [2]   Allergies  Allergen Reactions    Bee Venom Protein (Honey Bee) Hives    Clonazepam Other and Unknown     Thoughts of suicide    Elevated CNS   Thoughts of suicide     Elevated CNS   Thoughts of suicide   [3]   Past Surgical History:  Procedure Laterality Date    OTHER SURGICAL HISTORY  02/25/2020    No history of surgery    TONSILLECTOMY      US ASPIRATION INJECTION INTERMEDIATE JOINT  05/01/2020    US ASPIRATION INJECTION INTERMEDIATE JOINT 5/1/2020 [facility]

## 2025-06-12 NOTE — TELEPHONE ENCOUNTER
Pt called stating pharmacy does not have the medication in stock and asked that we resend to a different pharmacy. Med reordered and pharmacy contacted.

## 2025-07-17 ENCOUNTER — OFFICE VISIT (OUTPATIENT)
Dept: ORTHOPEDIC SURGERY | Facility: CLINIC | Age: 65
End: 2025-07-17
Payer: MEDICARE

## 2025-07-17 DIAGNOSIS — M65.839 INTERSECTION SYNDROME OF WRIST: ICD-10-CM

## 2025-07-17 DIAGNOSIS — M65.931 EXTENSOR TENOSYNOVITIS OF RIGHT WRIST: ICD-10-CM

## 2025-07-17 PROCEDURE — 99212 OFFICE O/P EST SF 10 MIN: CPT | Performed by: INTERNAL MEDICINE

## 2025-07-17 RX ORDER — METHYLPREDNISOLONE 4 MG/1
TABLET ORAL
Qty: 21 TABLET | Refills: 0 | Status: SHIPPED | OUTPATIENT
Start: 2025-07-17

## 2025-07-17 NOTE — PROGRESS NOTES
CC:   Chief Complaint   Patient presents with    Right Elbow - Follow-up     Lateral epicondylitis  S/p CSI - 06/12/25       HPI: Victor Hugo is a 65 y.o. male presents today for reevaluation for right elbow lateral epicondylitis. He states that he is doing well after the corticosteroid injection to the right elbow.  However now he is noticing pain in the right wrist.        Review of Systems   GENERAL: Negative for malaise, significant weight loss, fever  MUSCULOSKELETAL: See HPI  NEURO:  Negative for numbness / tingling     Past Medical History  Medical History[1]    Medication review  Medication Documentation Review Audit       Reviewed by Patsy Zimmerman MA (Medical Assistant) on 07/17/25 at 0934      Medication Order Taking? Sig Documenting Provider Last Dose Status   aspirin 81 mg EC tablet [id number]  Take 1 tablet (81 mg) by mouth once daily at bedtime. Historical MD Carie  Active   cetirizine (ZyrTEC) 10 mg tablet [id number] No Take 1 tablet (10 mg) by mouth once daily. Historical MD Carie Taking Active   cholecalciferol (Vitamin D-3) 50 mcg (2,000 unit) capsule [id number] No Take 1 capsule (2,000 Units) by mouth early in the morning.. WINTER USE ONLY Historical MD Carie Taking Active   diclofenac (Voltaren) 50 mg EC tablet [id number] No Take 1 tablet (50 mg) by mouth 2 times a day as needed (for pain). Augustus Stock MD Taking Active   docusate sodium (Colace) 100 mg capsule [id number] No Take 1 capsule (100 mg) by mouth every 12 hours. Historical MD Carie Taking Active   EPINEPHrine 0.3 mg/0.3 mL injection syringe [id number] No Inject 0.3 mL (0.3 mg) into the muscle 1 time if needed for anaphylaxis. Historical MD Carie Taking Active     Discontinued 12/15/23 1249   glucosamine HCl/chondroitin dewey (GLUCOSAMINE-CHONDROITIN ORAL) [id number] No Take 1,200 mg by mouth once daily. Historical MD Carie Taking Active   levothyroxine (Synthroid, Levoxyl) 75 mcg tablet [id number] No Take 1 tablet (75  mcg) by mouth once daily. as directed Rajesh Anderson, DO Taking Active   magnesium oxide (Mag-Ox) 400 mg tablet [id number] No Take 1 tablet (400 mg) by mouth once daily at bedtime. Historical Provider, MD Taking Active   methylPREDNISolone (Medrol Dospak) 4 mg tablets [id number]  Follow schedule on package instructions Jin Thibodeaux MD  Active   multivitamin with minerals (Adults Multivitamin) tablet [id number] No Take 1 tablet by mouth once daily. Historical Provider, MD Taking Active   rOPINIRole (Requip) 3 mg tablet [id number]  Take 1 tablet (3 mg) by mouth once daily at bedtime. Rajesh Anderson, DO  Active   simvastatin (Zocor) 40 mg tablet [id number] No Take 1 tablet (40 mg) by mouth once daily. Rajesh Anderson, DO Taking Active                    Allergies  RX Allergies[2]    Social History  Social History     Socioeconomic History    Marital status:      Spouse name: Not on file    Number of children: Not on file    Years of education: Not on file    Highest education level: Not on file   Occupational History    Not on file   Tobacco Use    Smoking status: Never    Smokeless tobacco: Never   Vaping Use    Vaping status: Never Used   Substance and Sexual Activity    Alcohol use: Yes     Comment: OCCASIONAL BEER    Drug use: Never    Sexual activity: Yes     Partners: Female   Other Topics Concern    Not on file   Social History Narrative    Not on file     Social Drivers of Health     Financial Resource Strain: Low Risk  (10/6/2020)    Received from [facility]    Overall Financial Resource Strain (CARDIA)     Difficulty of Paying Living Expenses: Not hard at all   Food Insecurity: No Food Insecurity (10/6/2020)    Received from [facility]    Hunger Vital Sign     Within the past 12 months, you worried that your food would run out before you got the money to buy more.: Never true     Within the past 12 months, the food you bought just didn't last and you didn't  have money to get more.: Never true   Transportation Needs: No Transportation Needs (10/6/2020)    [facility]    PRAPARE - Transportation     Lack of Transportation (Medical): No     Lack of Transportation (Non-Medical): No   Physical Activity: Not on file   Stress: Not on file   Social Connections: Not on file   Intimate Partner Violence: Not on file   Housing Stability: Not on file       Surgical History  Surgical History[3]    Physical Exam:  GENERAL:  Patient is awake, alert, and oriented to person place and time.  Patient appears well nourished and well kept.  Affect Calm, Not Acutely Distressed.  HEENT:  Normocephalic, Atraumatic, EOMI  CARDIOVASCULAR:  Hemodynamically stable.  RESPIRATORY:  Normal respirations with unlabored breathing.  Extremity: Right elbow shows skin is intact.  There is no erythema or warmth.  There is no clinical signs of infection.  No pain  over the lateral epicondyle.  No pain over the medial epicondyle.  No pain with resistant pronation and supination.  No pain with resistant extension of the right wrist.  Distal biceps tendon intact.  Distal triceps tendon intact.  Negative valgus stress of the right elbow.  Left elbow was examined for comparison.    Right hand and wrist examination shows skin is intact.  There is no pain of the distal radius or distal ulna.  There is no pain in the scaphoid bone.  Mild pain over the wrist over the extensor tendons, with clinical findings of intersection syndrome, with slight pain and swelling localized over the EPB, APL at the crossover site with ECRL and ECRB.  No obvious crepitus on examination today.    Diagnostics: None today   XR elbow right 3+ views  Interpreted By:  Jin Silva,   STUDY:  XR ELBOW RIGHT 3+ VIEWS, 5/21/2025 8:25 am      INDICATION:  Signs/Symptoms:pain      ACCESSION NUMBER(S):  [id number]      ORDERING CLINICIAN:  JIN SILVA      FINDINGS:  Right elbow x-rays three views AP,  lateral and oblique view: No acute  fractures, no dislocation. No joint effusion.          Signed by: Jin Thibodeaux 5/21/2025 11:04 AM  Dictation workstation:   [id number]        Procedure: None    Assessment:   Resolving right elbow lateral epicondylitis  Right wrist tenosynovitis with intersection syndrome      Plan: Victor Hugo presents today for reevaluation for right elbow lateral epicondylitis which has significantly improved after his corticosteroid injection with tenotomy.  He is having more pain secondary to his right wrist tenosynovitis with intersection syndrome, we recommended a trauma splint for support and occupational therapy.  Will also prescribe a Medrol Dosepak for the acute inflammation, he will follow-up in 4-5 weeks for reevaluation.     No orders of the defined types were placed in this encounter.     At the conclusion of the visit there were no further questions by the patient/family regarding their plan of care.  Patient was instructed to call or return with any issues, questions, or concerns regarding their injury and/or treatment plan described above.     07/17/25 at 9:47 AM - Jin Thibodeaux MD  Scribe Attestation  By signing my name below, Dank LEWIS Scribe   attest that this documentation has been prepared under the direction and in the presence of Jin Thibodeaux MD.    Office: [phone]    We already utilize the scribe attestation, Dank LEWIS am scribing for, and in the presence of Dr. Thibodeaux.    Jin LEWIS MD personally performed the services described in the documentation as scribed by Dank Jiménez in my presence, and confirm it is both accurate and complete.    This note was prepared using voice recognition software.  The details of this note are correct and have been reviewed, and corrected to the best of my ability.  Some grammatical errors may persist related to the Dragon software.         [1]   Past Medical History:  Diagnosis Date    Allergy status  to unspecified drugs, medicaments and biological substances     History of allergic reaction    Arthritis     Bronchitis     Bronchospasm     COVID-19     VACCINATED    GERD (gastroesophageal reflux disease)     Hyperlipidemia     Hypertension     Hypothyroidism     Nasal congestion 02/25/2020    Nasal congestion with rhinorrhea    Pain in unspecified hand 11/23/2021    Hand pain    Phimosis     RLS (restless legs syndrome)     Sleep apnea     NO CPAP    Strain of muscle, fascia and tendon of the posterior muscle group at thigh level, unspecified thigh, initial encounter 04/21/2020    Hamstring strain    Synovitis and tenosynovitis, unspecified 11/23/2021    Tenosynovitis of hand    Wears glasses    [2]   Allergies  Allergen Reactions    Bee Venom Protein (Honey Bee) Hives    Clonazepam Other and Unknown     Thoughts of suicide    Elevated CNS   Thoughts of suicide     Elevated CNS   Thoughts of suicide   [3]   Past Surgical History:  Procedure Laterality Date    OTHER SURGICAL HISTORY  02/25/2020    No history of surgery    TONSILLECTOMY      US ASPIRATION INJECTION INTERMEDIATE JOINT  05/01/2020    US ASPIRATION INJECTION INTERMEDIATE JOINT 5/1/2020 [facility]

## 2025-07-23 ENCOUNTER — TELEPHONE (OUTPATIENT)
Dept: PRIMARY CARE | Facility: CLINIC | Age: 65
End: 2025-07-23
Payer: MEDICARE

## 2025-07-23 DIAGNOSIS — E78.2 MIXED HYPERLIPIDEMIA: ICD-10-CM

## 2025-07-23 DIAGNOSIS — E03.9 HYPOTHYROIDISM IN ADULT: ICD-10-CM

## 2025-07-23 RX ORDER — SIMVASTATIN 40 MG/1
40 TABLET, FILM COATED ORAL DAILY
Qty: 90 TABLET | Refills: 3 | Status: SHIPPED | OUTPATIENT
Start: 2025-07-23

## 2025-07-23 RX ORDER — LEVOTHYROXINE SODIUM 75 UG/1
75 TABLET ORAL DAILY
Qty: 90 TABLET | Refills: 3 | Status: SHIPPED | OUTPATIENT
Start: 2025-07-23

## 2025-07-23 NOTE — TELEPHONE ENCOUNTER
Rx Refill Request     Name: Victor Hugo Esposito  :  1960   Medication Name:    simvastatin (Zocor) 40 mg tablet - Take 1 tablet (40 mg) by mouth once daily.   levothyroxine (Synthroid, Levoxyl) 75 mcg tablet - Take 1 tablet (75 mcg) by mouth once daily. as directed - oral   Specific Pharmacy location:  [facility]  Date of last appointment:  2025   Date of next appointment:  2025   Best number to reach patient:  [phone]

## 2025-08-06 ENCOUNTER — APPOINTMENT (OUTPATIENT)
Dept: PRIMARY CARE | Facility: CLINIC | Age: 65
End: 2025-08-06
Payer: MEDICARE

## 2025-08-14 ENCOUNTER — HOSPITAL ENCOUNTER (OUTPATIENT)
Dept: RADIOLOGY | Facility: CLINIC | Age: 65
Discharge: HOME | End: 2025-08-14
Payer: MEDICARE

## 2025-08-14 ENCOUNTER — HOSPITAL ENCOUNTER (OUTPATIENT)
Dept: RADIOLOGY | Facility: EXTERNAL LOCATION | Age: 65
Discharge: HOME | End: 2025-08-14

## 2025-08-14 ENCOUNTER — OFFICE VISIT (OUTPATIENT)
Dept: ORTHOPEDIC SURGERY | Facility: CLINIC | Age: 65
End: 2025-08-14
Payer: MEDICARE

## 2025-08-14 DIAGNOSIS — M77.11 LATERAL EPICONDYLITIS OF RIGHT ELBOW: ICD-10-CM

## 2025-08-14 DIAGNOSIS — M17.12 PRIMARY OSTEOARTHRITIS OF LEFT KNEE: ICD-10-CM

## 2025-08-14 DIAGNOSIS — M25.562 LEFT KNEE PAIN, UNSPECIFIED CHRONICITY: ICD-10-CM

## 2025-08-14 DIAGNOSIS — M25.562 LEFT KNEE PAIN, UNSPECIFIED CHRONICITY: Primary | ICD-10-CM

## 2025-08-14 PROCEDURE — 2500000004 HC RX 250 GENERAL PHARMACY W/ HCPCS (ALT 636 FOR OP/ED): Performed by: INTERNAL MEDICINE

## 2025-08-14 PROCEDURE — 73562 X-RAY EXAM OF KNEE 3: CPT | Mod: LEFT SIDE | Performed by: INTERNAL MEDICINE

## 2025-08-14 PROCEDURE — 1159F MED LIST DOCD IN RCRD: CPT | Performed by: INTERNAL MEDICINE

## 2025-08-14 PROCEDURE — 20611 DRAIN/INJ JOINT/BURSA W/US: CPT | Mod: LT | Performed by: INTERNAL MEDICINE

## 2025-08-14 PROCEDURE — 99214 OFFICE O/P EST MOD 30 MIN: CPT | Performed by: INTERNAL MEDICINE

## 2025-08-14 PROCEDURE — 1036F TOBACCO NON-USER: CPT | Performed by: INTERNAL MEDICINE

## 2025-08-14 PROCEDURE — 73562 X-RAY EXAM OF KNEE 3: CPT | Mod: LT

## 2025-08-14 PROCEDURE — 99212 OFFICE O/P EST SF 10 MIN: CPT | Mod: 25 | Performed by: INTERNAL MEDICINE

## 2025-08-14 RX ORDER — BETAMETHASONE SODIUM PHOSPHATE AND BETAMETHASONE ACETATE 3; 3 MG/ML; MG/ML
2 INJECTION, SUSPENSION INTRA-ARTICULAR; INTRALESIONAL; INTRAMUSCULAR; SOFT TISSUE
Status: COMPLETED | OUTPATIENT
Start: 2025-08-14 | End: 2025-08-14

## 2025-08-14 RX ORDER — LIDOCAINE HYDROCHLORIDE 10 MG/ML
5 INJECTION, SOLUTION INFILTRATION; PERINEURAL
Status: COMPLETED | OUTPATIENT
Start: 2025-08-14 | End: 2025-08-14

## 2025-08-14 RX ADMIN — BETAMETHASONE SODIUM PHOSPHATE AND BETAMETHASONE ACETATE 2 ML: 3; 3 INJECTION, SUSPENSION INTRA-ARTICULAR; INTRALESIONAL; INTRAMUSCULAR at 11:47

## 2025-08-14 RX ADMIN — LIDOCAINE HYDROCHLORIDE 5 ML: 10 INJECTION, SOLUTION INFILTRATION; PERINEURAL at 11:47

## (undated) DEVICE — LUBRICANT, SURGILUBE, STERILE, 2OZ

## (undated) DEVICE — STRAP, VELCRO, BODY, 4 X 60IN, NS

## (undated) DEVICE — PREP, SKIN, BETADINE, SOLUTION, 16 OZ

## (undated) DEVICE — Device

## (undated) DEVICE — SUTURE, CHROMIC, 4-0, 18 IN, PS2, BROWN

## (undated) DEVICE — STRAP, ARM BOARD, 32 X 1.5

## (undated) DEVICE — ELECTRODE, ELECTROSURGICAL, COATED NEEDLE, EDGE, STERILE

## (undated) DEVICE — GLOVE, SURGICAL, PROTEXIS PI , 8.5, PF, LF

## (undated) DEVICE — ADHESIVE, SKIN, LIQUIBAND EXCEED

## (undated) DEVICE — GOWN, SURGICAL, ROYAL SILK, XXL, STERILE

## (undated) DEVICE — GOWN, SURGICAL, ROYAL SILK, XL, STERILE

## (undated) DEVICE — BANDAGE, GAUZE, CONFORMING, 1 X 75 IN

## (undated) DEVICE — GLOVE, PROTEXIS PI CLASSIC, SZ-8.0, PF, PF, LF

## (undated) DEVICE — TOWEL PACK 10-PK

## (undated) DEVICE — SYRINGE, 10 CC, LUER LOCK

## (undated) DEVICE — CUP, MEDICINE, GRADUATED, 2 OZ, PLASTIC, DISP, LF

## (undated) DEVICE — BOWL, BASIN, 32 OZ, STERILE

## (undated) DEVICE — DRAPE, SHEET, XL

## (undated) DEVICE — GLOVE, SURGICAL, PROTEXIS PI BLUE W/NEUTHERA, 8.0, PF, LF

## (undated) DEVICE — SUTURE, VICRYL, 4-0, 18 IN, UNDYED BR PS-2

## (undated) DEVICE — NEEDLE, SAFETY, 25 GA X 1.5 IN

## (undated) DEVICE — BANDAGE, COHESIVE, HAND TEAR, COFLEX, 2 X 5 YDS, LF

## (undated) DEVICE — TRAY, SKIN SCRUB, WET PREP, WITH 4 COMPARMENT